# Patient Record
Sex: MALE | Race: WHITE | NOT HISPANIC OR LATINO | Employment: FULL TIME | URBAN - NONMETROPOLITAN AREA
[De-identification: names, ages, dates, MRNs, and addresses within clinical notes are randomized per-mention and may not be internally consistent; named-entity substitution may affect disease eponyms.]

---

## 2017-02-06 ENCOUNTER — ANESTHESIA (OUTPATIENT)
Dept: GASTROENTEROLOGY | Facility: HOSPITAL | Age: 47
End: 2017-02-06

## 2017-02-06 ENCOUNTER — HOSPITAL ENCOUNTER (OUTPATIENT)
Facility: HOSPITAL | Age: 47
Setting detail: HOSPITAL OUTPATIENT SURGERY
Discharge: HOME | End: 2017-02-06
Attending: INTERNAL MEDICINE | Admitting: INTERNAL MEDICINE

## 2017-02-06 ENCOUNTER — ANESTHESIA EVENT (OUTPATIENT)
Dept: GASTROENTEROLOGY | Facility: HOSPITAL | Age: 47
End: 2017-02-06

## 2017-02-06 VITALS
WEIGHT: 275.57 LBS | OXYGEN SATURATION: 96 % | DIASTOLIC BLOOD PRESSURE: 68 MMHG | SYSTOLIC BLOOD PRESSURE: 148 MMHG | RESPIRATION RATE: 18 BRPM | BODY MASS INDEX: 38.58 KG/M2 | TEMPERATURE: 97.6 F | HEART RATE: 102 BPM | HEIGHT: 71 IN

## 2017-02-06 DIAGNOSIS — K21.9 GASTRIC REFLUX: ICD-10-CM

## 2017-02-06 DIAGNOSIS — R19.5 ABNORMAL FECES: ICD-10-CM

## 2017-02-06 PROCEDURE — 88313 SPECIAL STAINS GROUP 2: CPT | Performed by: INTERNAL MEDICINE

## 2017-02-06 PROCEDURE — 25010000002 PROPOFOL 10 MG/ML EMULSION: Performed by: NURSE ANESTHETIST, CERTIFIED REGISTERED

## 2017-02-06 PROCEDURE — 88305 TISSUE EXAM BY PATHOLOGIST: CPT | Performed by: PATHOLOGY

## 2017-02-06 PROCEDURE — 88342 IMHCHEM/IMCYTCHM 1ST ANTB: CPT | Performed by: INTERNAL MEDICINE

## 2017-02-06 PROCEDURE — 88313 SPECIAL STAINS GROUP 2: CPT | Performed by: PATHOLOGY

## 2017-02-06 PROCEDURE — 88342 IMHCHEM/IMCYTCHM 1ST ANTB: CPT | Performed by: PATHOLOGY

## 2017-02-06 PROCEDURE — 88305 TISSUE EXAM BY PATHOLOGIST: CPT | Performed by: INTERNAL MEDICINE

## 2017-02-06 RX ORDER — DEXTROSE AND SODIUM CHLORIDE 5; .45 G/100ML; G/100ML
20 INJECTION, SOLUTION INTRAVENOUS CONTINUOUS
Status: DISCONTINUED | OUTPATIENT
Start: 2017-02-06 | End: 2017-02-06 | Stop reason: HOSPADM

## 2017-02-06 RX ORDER — LIDOCAINE HYDROCHLORIDE 10 MG/ML
INJECTION, SOLUTION INFILTRATION; PERINEURAL AS NEEDED
Status: DISCONTINUED | OUTPATIENT
Start: 2017-02-06 | End: 2017-02-06 | Stop reason: SURG

## 2017-02-06 RX ORDER — PROPOFOL 10 MG/ML
VIAL (ML) INTRAVENOUS AS NEEDED
Status: DISCONTINUED | OUTPATIENT
Start: 2017-02-06 | End: 2017-02-06 | Stop reason: SURG

## 2017-02-06 RX ADMIN — LIDOCAINE HYDROCHLORIDE 100 MG: 10 INJECTION, SOLUTION INFILTRATION; PERINEURAL at 16:05

## 2017-02-06 RX ADMIN — PROPOFOL 50 MG: 10 INJECTION, EMULSION INTRAVENOUS at 16:10

## 2017-02-06 RX ADMIN — PROPOFOL 60 MG: 10 INJECTION, EMULSION INTRAVENOUS at 16:04

## 2017-02-06 RX ADMIN — DEXTROSE AND SODIUM CHLORIDE 20 ML/HR: 5; 450 INJECTION, SOLUTION INTRAVENOUS at 15:23

## 2017-02-06 NOTE — PLAN OF CARE
Problem: GI Endoscopy (Adult)  Goal: Signs and Symptoms of Listed Potential Problems Will be Absent or Manageable (GI Endoscopy)  Outcome: Ongoing (interventions implemented as appropriate)    02/06/17 7499   GI Endoscopy   Problems Assessed (GI Endoscopy) all   Problems Present (GI Endoscopy) none

## 2017-02-06 NOTE — H&P
Dea Shrestha DO,[facility]  Gastroenterology  Hepatology  Endoscopy  Board Certified in Internal Medicine and gastroenterology  [address], [address]  [place]. [address]  - [phone]   F - [phone]     GASTROENTEROLOGY HISTORY AND PHYSICAL  NOTE   DEA SHRESTHA DO.         SUBJECTIVE:   2/6/2017    Name: Dk Goldstein  DOD: 1970      Chief Complaint:   Subjective : Acid reflux, occult blood within the stools, right upper quadrant pain with gallstones.    Patient is 46 y.o. male presents with for elective EGD and colonoscopy.  This is to evaluate because of the progressive problems with acid reflux.  She was found to have occult blood within the stools during the clinic evaluation..      ROS/HISTORY/ CURRENT MEDICATIONS/OBJECTIVE/VS/PE:   Review of Systems:   Review of Systems    History:     Past Medical History   Diagnosis Date   • Acute pharyngitis    • Acute sinusitis    • Allergic rhinitis    • Benign hypertension    • Blood glucose abnormal    • Blood in urine    • Encounter for routine adult health examination      Adult health examination   • Essential hypertension    • Gastroesophageal reflux disease    • Hyperlipidemia    • Muscle pain    • Upper respiratory infection    • Vitamin D deficiency    • Wheezing    • Worried well      No past surgical history on file.  Family History   Problem Relation Age of Onset   • Diabetes Father    • Hypertension Father    • Heart disease Father      Ischemic   • Heart attack Father      MI in 40s     Social History   Substance Use Topics   • Smoking status: Former Smoker     Years: 20.00   • Smokeless tobacco: Not on file   • Alcohol use Not on file     Prescriptions Prior to Admission   Medication Sig Dispense Refill Last Dose   • atenolol (TENORMIN) 25 MG tablet Take 25 mg by mouth Daily.   2/5/2017 at Unknown time   • hydrOXYzine (ATARAX) 25 MG tablet Take 25 mg by mouth Daily. x 30 days   2/5/2017 at Unknown time   • pantoprazole  (PROTONIX) 40 MG EC tablet Take 40 mg by mouth Daily.   2/5/2017 at Unknown time   • simvastatin (ZOCOR) 40 MG tablet Take 40 mg by mouth Daily.   2/5/2017 at Unknown time   • budesonide (RHINOCORT AQUA) 32 MCG/ACT nasal spray 2 sprays into each nostril Every Morning. x 9 days   More than a month at Unknown time   • cyclobenzaprine (FLEXERIL) 10 MG tablet Take  by mouth At Night As Needed.      • fexofenadine (ALLEGRA ALLERGY) 60 MG tablet Take 60 mg by mouth 2 (Two) Times a Day.      • ipratropium (ATROVENT) 0.03 % nasal spray 2 sprays into each nostril Daily As Needed. x 5 days for congestions   More than a month at Unknown time     Allergies:  Amoxicillin; Penicillins; and Cefprozil    I have reviewed the patients medical history, surgical history and family history in the available medical record system.     Current Medications:     Current Facility-Administered Medications   Medication Dose Route Frequency Provider Last Rate Last Dose   • dextrose 5 % and sodium chloride 0.45 % infusion  20 mL/hr Intravenous Continuous Juan Luis Swann DO           Objective     Physical Exam:   Temp:  [98 °F (36.7 °C)] 98 °F (36.7 °C)  Heart Rate:  [86] 86  BP: (169)/(94) 169/94    Physical Exam:  General Appearance:    Alert, cooperative, in no acute distress   Head:    Normocephalic, without obvious abnormality, atraumatic   Eyes:            Lids and lashes normal, conjunctivae and sclerae normal, no   icterus, no pallor, corneas clear, PERRLA   Ears:    Ears appear intact with no abnormalities noted   Throat:   No oral lesions, no thrush, oral mucosa moist   Neck:   No adenopathy, supple, trachea midline, no thyromegaly, no     carotid bruit, no JVD   Back:     No kyphosis present, no scoliosis present, no skin lesions,       erythema or scars, no tenderness to percussion or                   palpation,   range of motion normal   Lungs:     Clear to auscultation,respirations regular, even and                   unlabored     Heart:    Regular rhythm and normal rate, normal S1 and S2, no            murmur, no gallop, no rub, no click   Breast Exam:    Deferred   Abdomen:     Normal bowel sounds, no masses, no organomegaly, soft        non-tender, non-distended, no guarding, no rebound                 tenderness   Genitalia:    Deferred   Extremities:   Moves all extremities well, no edema, no cyanosis, no              redness   Pulses:   Pulses palpable and equal bilaterally   Skin:   No bleeding, bruising or rash   Lymph nodes:   No palpable adenopathy   Neurologic:   Cranial nerves 2 - 12 grossly intact, sensation intact, DTR        present and equal bilaterally      Results Review:     Lab Results   Component Value Date    WBC 7.8 02/14/2014    HGB 13.8 02/14/2014    HCT 41.3 02/14/2014     02/14/2014             No results found for: LIPASE  No results found for: INR       Radiology Review:  Imaging Results (last 72 hours)     ** No results found for the last 72 hours. **           I reviewed the patient's new clinical results.  I reviewed the patient's new imaging results and agree with the interpretation.     ASSESSMENT/PLAN:   ASSESSMENT:   1.  Acid reflux  2.  Occult blood within the stools  3.  Gallstones without cholecystitis    PLAN:   1.  EGD with biopsies  2.  Colonoscopy    Risk and benefits associated with the procedure are reviewed with the patient and he wishes to proceed.       Juan Luis Swann DO  02/06/17  3:20 PM

## 2017-02-06 NOTE — DISCHARGE INSTRUCTIONS
Juan Luis Swann  [address]  [place] [address]  #[phone] Fisher      Please call for an appointment for a 1 month follow-up      Outpatient Instructions for Monitored Anesthesia Care (MAC)    1. You will be released from the hospital in the care of a responsible adult who should remain with you for at least 6 hours.    2. You are at an increased risk for falls following anesthesia. Use care when changing from a lying to a sitting position. Use your assistive devices ( example: cane, walker or family member).    3. You must NOT drive a car, climb high places such as a ladder, or operate equipment such as electric knives,stoves etc...for at least 12 hours. If you are dizzy for longer than 24 hours, notify your doctor.    4. DO NOT drink any alcoholic beverages for at least 24 hours. Anesthesia may impair your judgement.    5. If you smoke, do not smoke alone due to increased risk of burns/fires.    6. DO NOT undertake any legally binding commitment for at least 24 hours. Anesthesia may impair your judgement.

## 2017-02-06 NOTE — ANESTHESIA PREPROCEDURE EVALUATION
Anesthesia Evaluation     Patient summary reviewed and Nursing notes reviewed   NPO Status: > 8 hours   Airway   Mallampati: III  TM distance: >3 FB  Neck ROM: full  no difficulty expected  Dental - normal exam     Pulmonary - normal exam   (+) a smoker Former,   Cardiovascular - negative cardio ROS and normal exam        Neuro/Psych- negative ROS  GI/Hepatic/Renal/Endo    (+)  GERD,     Musculoskeletal (-) negative ROS    Abdominal  - normal exam   Substance History - negative use     OB/GYN negative ob/gyn ROS         Other - negative ROS                                   Anesthesia Plan    ASA 3     MAC     Anesthetic plan and risks discussed with patient.

## 2017-02-06 NOTE — ANESTHESIA POSTPROCEDURE EVALUATION
Patient: Dk Goldstein    Procedure Summary     Date Anesthesia Start Anesthesia Stop Room / Location    02/06/17 [id number] ENDOSCOPY 2 / [facility] ENDOSCOPY       Procedure Diagnosis Surgeon Provider    ESOPHAGOGASTRODUODENOSCOPY (N/A Esophagus); COLONOSCOPY (N/A ) Gastric reflux; Abnormal feces  (GASTRIC REFLUX, ABNORMAL FECES) DO Kym Easton CRNA          Anesthesia Type: MAC  Last vitals  BP      Temp      Pulse     Resp      SpO2        Post Anesthesia Care and Evaluation    Patient location during evaluation: bedside  Patient participation: complete - patient cannot participate  Level of consciousness: sleepy but conscious  Pain score: 0  Pain management: adequate  Airway patency: patent  Anesthetic complications: No anesthetic complications  PONV Status: none  Cardiovascular status: acceptable  Respiratory status: acceptable  Hydration status: acceptable

## 2017-02-06 NOTE — PLAN OF CARE
Problem: Patient Care Overview (Adult)  Goal: Plan of Care Review    02/06/17 4273   Coping/Psychosocial Response Interventions   Plan Of Care Reviewed With patient;spouse   Patient Care Overview   Progress no change

## 2017-02-08 LAB
LAB AP CASE REPORT: NORMAL
LAB AP DIAGNOSIS COMMENT: NORMAL
Lab: NORMAL
PATH REPORT.FINAL DX SPEC: NORMAL
PATH REPORT.GROSS SPEC: NORMAL

## 2017-02-27 ENCOUNTER — CONSULT (OUTPATIENT)
Dept: SURGERY | Facility: CLINIC | Age: 47
End: 2017-02-27

## 2017-02-27 VITALS
DIASTOLIC BLOOD PRESSURE: 86 MMHG | BODY MASS INDEX: 40.04 KG/M2 | WEIGHT: 286 LBS | SYSTOLIC BLOOD PRESSURE: 138 MMHG | HEIGHT: 71 IN

## 2017-02-27 DIAGNOSIS — K80.10 CALCULUS OF GALLBLADDER WITH CHRONIC CHOLECYSTITIS WITHOUT OBSTRUCTION: Primary | ICD-10-CM

## 2017-02-27 PROCEDURE — 99203 OFFICE O/P NEW LOW 30 MIN: CPT | Performed by: SURGERY

## 2017-02-27 RX ORDER — SODIUM CHLORIDE, SODIUM LACTATE, POTASSIUM CHLORIDE, CALCIUM CHLORIDE 600; 310; 30; 20 MG/100ML; MG/100ML; MG/100ML; MG/100ML
100 INJECTION, SOLUTION INTRAVENOUS CONTINUOUS
Status: CANCELLED | OUTPATIENT
Start: 2017-02-27

## 2017-02-27 RX ORDER — ERGOCALCIFEROL 1.25 MG/1
50000 CAPSULE ORAL WEEKLY
COMMUNITY
Start: 2017-02-26

## 2017-02-27 RX ORDER — CHLORHEXIDINE GLUCONATE 0.12 MG/ML
15 RINSE ORAL EVERY 12 HOURS SCHEDULED
Status: CANCELLED | OUTPATIENT
Start: 2017-02-27

## 2017-02-27 RX ORDER — ALBUTEROL SULFATE 90 UG/1
2 AEROSOL, METERED RESPIRATORY (INHALATION) EVERY 6 HOURS
COMMUNITY
Start: 2017-02-14 | End: 2018-02-15

## 2017-02-27 RX ORDER — LEVOFLOXACIN 5 MG/ML
500 INJECTION, SOLUTION INTRAVENOUS ONCE
Status: CANCELLED | OUTPATIENT
Start: 2017-02-27 | End: 2017-02-27

## 2017-02-27 RX ORDER — FUROSEMIDE 40 MG/1
40 TABLET ORAL 2 TIMES DAILY
COMMUNITY
Start: 2016-11-11

## 2017-02-27 RX ORDER — SODIUM CHLORIDE 0.9 % (FLUSH) 0.9 %
1-10 SYRINGE (ML) INJECTION AS NEEDED
Status: CANCELLED | OUTPATIENT
Start: 2017-02-27

## 2017-02-27 RX ORDER — SUCRALFATE 1 G/1
1 TABLET ORAL AS NEEDED
COMMUNITY
Start: 2017-01-21 | End: 2017-03-01

## 2017-02-27 NOTE — PROGRESS NOTES
Chief Complaint   Patient presents with   • Cholelithiasis     possible gallbladder problems   • Hernia     possible hiatus hernia.        Hernia   This is a chronic problem. The current episode started more than 1 year ago. The problem occurs constantly. The problem has been gradually worsening. Associated symptoms include abdominal pain (radiates to the back), nausea and vomiting. Pertinent negatives include no anorexia, arthralgias, change in bowel habit, chest pain, chills, fever, headaches, myalgias, neck pain, numbness, rash, urinary symptoms or weakness. The symptoms are aggravated by eating. He has tried rest for the symptoms. The treatment provided no relief.     CT at [facility] gallstones    EGD   The hypopharynx was normal.  Findings:  - Circumferential salmon-colored mucosa was present at 38 cm and circumferential salmon-colored mucosa was  present from 38 to 40 cm. No other visible abnormalities were present. The maximum longitudinal extent of these  esophageal mucosal changes was 2 cm in length. Biopsies were taken with a cold forceps for histology. Estimated  blood loss: none.  - One mild benign-appearing, intrinsic stenosis was found in the lower third of the esophagus. This measured 1.3 cm  (inner diameter) x less than one cm (in length) and was traversed.  - A 2 cm hiatus hernia was present.  - A small hiatus hernia was present.  - Localized mild inflammation characterized by erythema was found in the gastric antrum. Estimated blood loss: none.  - The examined duodenum was normal.    Colonoscopy  - A 10 mm polyp was found in the sigmoid colon. The polyp was sessile. The polyp was removed with a hot snare.  Resection and retrieval were complete. Estimated blood loss: none.  Findings:  - Three sessile polyps were found in the sigmoid colon. The polyps were 3 to 6 mm in size. These polyps were  removed with a cold biopsy forceps. Resection and retrieval were complete. Estimated blood loss: none.  -  Multiple small and large-mouthed diverticula were found in the sigmoid colon and in the descending colon.  - Non-bleeding internal hemorrhoids were found during retroflexion. The hemorrhoids were Grade I (internal  hemorrhoids that do not prolapse).  - The exam was otherwise without abnormality.      Past Medical History   Diagnosis Date   • Acute pharyngitis    • Acute sinusitis    • Allergic rhinitis    • Benign hypertension    • Blood glucose abnormal    • Blood in urine    • Encounter for routine adult health examination      Adult health examination   • Essential hypertension    • Gastroesophageal reflux disease    • Hyperlipidemia    • Muscle pain    • Upper respiratory infection    • Vitamin D deficiency    • Wheezing    • Worried well        Past Surgical History   Procedure Laterality Date   • Endoscopy N/A 2/6/2017     Procedure: ESOPHAGOGASTRODUODENOSCOPY;  Surgeon: Juan Luis Swann DO;  Location: [facility] ENDOSCOPY;  Service:    • Colonoscopy N/A 2/6/2017     Procedure: COLONOSCOPY;  Surgeon: Juan Luis Swann DO;  Location: [facility] ENDOSCOPY;  Service:          Current Outpatient Prescriptions:   •  albuterol (PROVENTIL HFA;VENTOLIN HFA) 108 (90 BASE) MCG/ACT inhaler, Inhale 2 puffs Every 6 (Six) Hours., Disp: , Rfl:   •  atenolol (TENORMIN) 25 MG tablet, Take 25 mg by mouth Daily., Disp: , Rfl:   •  cyclobenzaprine (FLEXERIL) 10 MG tablet, Take  by mouth At Night As Needed., Disp: , Rfl:   •  furosemide (LASIX) 40 MG tablet, Take 40 mg by mouth Daily., Disp: , Rfl:   •  hydrOXYzine (ATARAX) 25 MG tablet, Take 25 mg by mouth Daily. x 30 days, Disp: , Rfl:   •  pantoprazole (PROTONIX) 40 MG EC tablet, Take 40 mg by mouth Daily., Disp: , Rfl:   •  simvastatin (ZOCOR) 40 MG tablet, Take 40 mg by mouth Daily., Disp: , Rfl:   •  vitamin D (ERGOCALCIFEROL) 53777 UNITS capsule capsule, Take 50,000 Units by mouth 1 (One) Time Per Week., Disp: , Rfl:   •  budesonide (RHINOCORT AQUA) 32 MCG/ACT nasal spray, 2  sprays into each nostril Every Morning. x 9 days, Disp: , Rfl:   •  fexofenadine (ALLEGRA ALLERGY) 60 MG tablet, Take 60 mg by mouth 2 (Two) Times a Day., Disp: , Rfl:   •  ipratropium (ATROVENT) 0.03 % nasal spray, 2 sprays into each nostril Daily As Needed. x 5 days for congestions, Disp: , Rfl:   •  sucralfate (CARAFATE) 1 G tablet, Take 1 tablet by mouth As Needed., Disp: , Rfl:     Allergies   Allergen Reactions   • Amoxicillin    • Penicillins    • Cefprozil Rash       Family History   Problem Relation Age of Onset   • Diabetes Father    • Hypertension Father    • Heart disease Father      Ischemic   • Heart attack Father      MI in 40s       Social History     Social History   • Marital status:        Social History Main Topics   • Smoking status: Former Smoker     Years: 20.00   • Smokeless tobacco: Not on file   • Alcohol use No   • Drug use: Not on file   • Sexual activity: Not on file      Comment:          Review of Systems   Constitutional: Negative for activity change, appetite change, chills and fever.   HENT: Negative for hearing loss, nosebleeds and trouble swallowing.    Respiratory: Positive for wheezing.    Cardiovascular: Negative for chest pain, palpitations and leg swelling.   Gastrointestinal: Positive for abdominal pain (radiates to the back), blood in stool, nausea and vomiting. Negative for abdominal distention, anal bleeding, anorexia, change in bowel habit, constipation, diarrhea and rectal pain.   Endocrine: Negative for cold intolerance, heat intolerance, polydipsia and polyuria.   Genitourinary: Positive for hematuria. Negative for decreased urine volume, difficulty urinating, dysuria, enuresis, frequency and urgency.   Musculoskeletal: Negative for arthralgias, back pain, gait problem, myalgias and neck pain.   Skin: Negative for pallor, rash and wound.   Allergic/Immunologic: Negative for immunocompromised state.   Neurological: Negative for dizziness, seizures,  weakness, light-headedness, numbness and headaches.   Psychiatric/Behavioral: Negative for agitation and behavioral problems. The patient is not nervous/anxious.        Physical Exam   Constitutional: He is oriented to person, place, and time and well-developed, well-nourished, and in no distress. Vital signs are normal. No distress.       HENT:   Head: Normocephalic and atraumatic.   Eyes: Conjunctivae and EOM are normal. Pupils are equal, round, and reactive to light.   Neck: Normal range of motion. Neck supple. No JVD present. No tracheal deviation present. No thyromegaly present.   Cardiovascular: Normal rate, regular rhythm and normal heart sounds.    Pulmonary/Chest: Effort normal and breath sounds normal. No stridor. No respiratory distress. He has no wheezes. He has no rales. He exhibits no tenderness.   Abdominal: Soft. Normal appearance and bowel sounds are normal. He exhibits no distension and no mass. There is no hepatomegaly. There is tenderness (mild RUQ pain). There is no rebound and no guarding. No hernia.       Lymphadenopathy:     He has no cervical adenopathy.     He has no axillary adenopathy.   Neurological: He is oriented to person, place, and time.   Skin: Skin is warm, dry and intact. No lesion and no rash noted. He is not diaphoretic. No erythema.   Psychiatric: Mood, memory, affect and judgment normal.   Nursing note and vitals reviewed.      ASSESSMENT    Dk was seen today for cholelithiasis and hernia.    Diagnoses and all orders for this visit:    Calculus of gallbladder with chronic cholecystitis without obstruction  -     Case Request; Standing  -     Comprehensive Metabolic Panel; Future  -     ECG 12 Lead; Future  -     chlorhexidine (PERIDEX) 0.12 % solution 15 mL; Apply 15 mL to the mouth or throat Every 12 (Twelve) Hours.  -     sodium chloride 0.9 % flush 1-10 mL; Infuse 1-10 mL into a venous catheter As Needed for line care.  -     lactated ringers infusion; Infuse 100  mL/hr into a venous catheter Continuous.  -     levoFLOXacin (LEVAQUIN) 500 mg in sodium chloride 0.9 % 150 mL IVPB; Infuse 500 mg into a venous catheter 1 (One) Time.  -     Case Request    Other orders  -     Follow anesthesia standing orders.; Future  -     Provide instructions to patient on NPO status  -     Follow anesthesia standing orders.; Standing  -     Verify NPO Status; Standing  -     Obtain informed consent; Standing  -     SCD (sequential compression device)- to be placed on patient in Pre-op; Standing  -     Clip operative site; Standing  -     Have patient void prior to transfer; Standing  -     Insert Peripheral IV; Standing  -     Saline Lock & Maintain IV Access; Standing      PLAN    1. Laparoscopic possible open cholecystectomy    This procedure involves removing the gallbladder.  The surgery may be done laparoscopically. Laparoscopic surgery is done using a scope and hollow tube(s) called ports. These are inserted through small cuts in the abdomen. A scope is a thin, lighted instrument with a camera attached. Tools are passed through the ports. Carbon dioxide gas is pumped into the abdomen to create workspace.   If the surgery cannot be performed with a scope, it may be done through a larger cut. This occurs 2% of the time.  The gall bladder will be removed after it is  from the liver, bile duct, and surrounding arteries. An x-ray of the bile ducts is usually performed with contrast dye injected into the ducts.  If stones are found, another procedure may be required to remove them.  A drain may rarely be inserted to keep fluid from building up in the treatment area.   Alternatives include:  * Watching and waiting with no surgery  * Removing the gallbladder through one large incision made in the abdomen.  Prognosis and possible risks if no operation/procedure is performed:  Pain, infection, inflammation, and/or stones may continue or get worse.  Risks of the operation/procedure:  *  Exposure to radiation. Pregnant women and women of childbearing age should talk with their doctor about this.  * Pain, numbness, swelling, weakness or scarring where tissue is cut.  * The gas used in laparoscopic procedures to inflate the abdomen can become trapped in tissues. Gas in the bloodstream can dangerously affect blood flow and  heart function.  * The procedure may not cure or relieve your condition or symptoms. They may come back and even worsen.  * You may need additional tests or treatment.  * Bleeding. You may need blood transfusions or other treatments. This may be discovered during the procedure, or later.  * Embolism. An embolism is an object that moves through your body in your bloodstream. It can be an air bubble, a blood clot, a piece of fat or other material. It can block a blood vessel. This can lead to stroke, pulmonary embolism (blockage of the main artery of the lung), or injury to organs or extremities.  * Reactions to dye used for imaging. These may include hives, swelling of the face and/or throat, difficulty breathing, and kidney failure.  * Retained stones in bile ducts.  * Wound infection, poor healing or reopening. Blood or clear fluid can also collect at the wound site(s).  * Damage to the bile ducts or nearby structures. This may be discovered during the procedure, or later.  * Incisional hernia. Weak scar tissue may allow tissue to bulge through the cut.  * The instrument(s) placed in your abdomen can cause injuries to nearby structures.  * Death.  Expectations:  * This procedure may relieve or prevent infection, inflammation, and/or pain from stones or blockage of bile ducts.  * If you are on metformin, it will need to be held for 48 hours after surgery.    Signed by Oswaldo Cates MD

## 2017-03-01 ENCOUNTER — APPOINTMENT (OUTPATIENT)
Dept: PREADMISSION TESTING | Facility: HOSPITAL | Age: 47
End: 2017-03-01

## 2017-03-01 VITALS
RESPIRATION RATE: 16 BRPM | WEIGHT: 281 LBS | HEIGHT: 71 IN | DIASTOLIC BLOOD PRESSURE: 70 MMHG | SYSTOLIC BLOOD PRESSURE: 120 MMHG | HEART RATE: 79 BPM | BODY MASS INDEX: 39.34 KG/M2 | OXYGEN SATURATION: 96 %

## 2017-03-01 DIAGNOSIS — K80.10 CALCULUS OF GALLBLADDER WITH CHRONIC CHOLECYSTITIS WITHOUT OBSTRUCTION: ICD-10-CM

## 2017-03-01 LAB
ALBUMIN SERPL-MCNC: 4.4 G/DL (ref 3.4–4.8)
ALBUMIN/GLOB SERPL: 1.3 G/DL (ref 1.1–1.8)
ALP SERPL-CCNC: 63 U/L (ref 38–126)
ALT SERPL W P-5'-P-CCNC: 85 U/L (ref 21–72)
ANION GAP SERPL CALCULATED.3IONS-SCNC: 8 MMOL/L (ref 5–15)
AST SERPL-CCNC: 73 U/L (ref 17–59)
BILIRUB SERPL-MCNC: 0.7 MG/DL (ref 0.2–1.3)
BUN BLD-MCNC: 16 MG/DL (ref 7–21)
BUN/CREAT SERPL: 15.4 (ref 7–25)
CALCIUM SPEC-SCNC: 10.1 MG/DL (ref 8.4–10.2)
CHLORIDE SERPL-SCNC: 102 MMOL/L (ref 95–110)
CO2 SERPL-SCNC: 28 MMOL/L (ref 22–31)
CREAT BLD-MCNC: 1.04 MG/DL (ref 0.7–1.3)
GFR SERPL CREATININE-BSD FRML MDRD: 77 ML/MIN/1.73 (ref 60–147)
GLOBULIN UR ELPH-MCNC: 3.3 GM/DL (ref 2.3–3.5)
GLUCOSE BLD-MCNC: 78 MG/DL (ref 60–100)
POTASSIUM BLD-SCNC: 4.6 MMOL/L (ref 3.5–5.1)
PROT SERPL-MCNC: 7.7 G/DL (ref 6.3–8.6)
SODIUM BLD-SCNC: 138 MMOL/L (ref 137–145)

## 2017-03-01 PROCEDURE — 80053 COMPREHEN METABOLIC PANEL: CPT | Performed by: SURGERY

## 2017-03-01 PROCEDURE — 93005 ELECTROCARDIOGRAM TRACING: CPT

## 2017-03-01 PROCEDURE — 36415 COLL VENOUS BLD VENIPUNCTURE: CPT

## 2017-03-01 NOTE — DISCHARGE INSTRUCTIONS
[facility]    Preparing the Skin Before Surgery  Preparing or “prepping” skin before surgery can reduce the risk of infection at the surgical site. To make the process easier, this facility has chosen disposable cloths moistened with a rinse-free, 2% Chlorhexidine Gluconate (CHG) antiseptic solution. The steps below outline the prepping process and should be carefully followed.      Prep the skin at the following time(s):                       Prep the circled area(s) only:        *Skin should be prepped at home on   the night prior to surgery.         *If you wish to bathe/shower, do so   at least one hour before you prep   your skin with the cloths.  *Do not shave hair in surgical area for at   least 2 days prior to applying prep  _______________________________  _______________________________       Directions for Prepping Skin:  ? When applying CHG, your skin should be completely dry and cool.  ? Open package and remove cloths. Avoid touching cloths to any surface other than specified area to reduce the risk of contamination.  ? Prep the area(s) circled above by wiping the area in a back-and-forth motion.    Avoid contact with eyes, ears, mouth, and mucous membranes. When applied to sensitive skin, CHG may cause skin irritation such as a temporary itching sensation  and/or redness.         If itching or redness persists, rinse affected areas and discontinue use.  ? Use all cloths in the package(s).   ? Allow area to air dry for one minute. DO NOT RINSE. It is normal for the skin to have a temporary “tacky” feel for several minutes after the antiseptic solution is applied.   ? Discard cloths in trash can.  ? Place the Prep Check™ sticker(s) from the package on the bottom of this sheet as indicated.  ? Once the skin prep has been completed, do not bathe/shower, apply make-up, powder, or lotions to skin.                  [facility]  Pre-op Information and Guidelines    You will  be called after 2 p.m. the day before your surgery (Friday for Monday surgery) and notified of your time for arrival and approximate surgery time.  If you have not received a call by 4P.M., please contact Same Day Surgery at [phone] of if outside [facility] call [phone].    Please Follow these Important Safety Guidelines:    • The morning of your procedure, take only the medications listed below with   A sip of water:_____________________________________________       ______________________________________________    • DO NOT eat or drink anything after 12:00 midnight the night before surgery  Specific instructions concerning drinking clear liquids will be discussed during  the pre-surgery instruction call the day before your surgery.    • If you take a blood thinner (ex. Plavix, Coumadin, aspirin), ask your doctor when to stop it before surgery  STOP DATE: _________________    • Only 2 visitors are allowed in patient rooms at a time  Your visitors will be asked to wait in the lobby until the admission process is complete with the exception of a parent with a child and patients in need of special assistance.    • YOU CANNOT DRIVE YOURSELF HOME  You must be accompanied by someone who will be responsible for driving you home after surgery and for your care at home.    • DO NOT chew gum, use breath mints, hard candy, or smoke the day of surgery  • DO NOT drink alcohol for at least 24 hours before your surgery  • DO NOT wear any jewelry and remove all body piercing before coming to the hospital  • DO NOT wear make-up to the hospital  • If you are having surgery on an extremity (arm/leg/foot) remove nail polish/artificial nails on the surgical side  • Clothing, glasses, contacts, dentures, and hairpieces must be removed before surgery  Bathe the night before or the morning of your surgery and do not use powders/lotions on skin.

## 2017-03-03 ENCOUNTER — ANESTHESIA EVENT (OUTPATIENT)
Dept: PERIOP | Facility: HOSPITAL | Age: 47
End: 2017-03-03

## 2017-03-06 ENCOUNTER — APPOINTMENT (OUTPATIENT)
Dept: GENERAL RADIOLOGY | Facility: HOSPITAL | Age: 47
End: 2017-03-06

## 2017-03-06 ENCOUNTER — ANESTHESIA (OUTPATIENT)
Dept: PERIOP | Facility: HOSPITAL | Age: 47
End: 2017-03-06

## 2017-03-06 ENCOUNTER — HOSPITAL ENCOUNTER (OUTPATIENT)
Facility: HOSPITAL | Age: 47
Setting detail: HOSPITAL OUTPATIENT SURGERY
Discharge: HOME | End: 2017-03-06
Attending: SURGERY | Admitting: SURGERY

## 2017-03-06 VITALS
HEART RATE: 75 BPM | BODY MASS INDEX: 43.98 KG/M2 | TEMPERATURE: 97 F | HEIGHT: 67 IN | SYSTOLIC BLOOD PRESSURE: 142 MMHG | RESPIRATION RATE: 18 BRPM | WEIGHT: 280.2 LBS | OXYGEN SATURATION: 97 % | DIASTOLIC BLOOD PRESSURE: 80 MMHG

## 2017-03-06 DIAGNOSIS — K80.10 CALCULUS OF GALLBLADDER WITH CHRONIC CHOLECYSTITIS WITHOUT OBSTRUCTION: ICD-10-CM

## 2017-03-06 PROCEDURE — 25010000002 MIDAZOLAM PER 1 MG: Performed by: NURSE ANESTHETIST, CERTIFIED REGISTERED

## 2017-03-06 PROCEDURE — 25010000002 ONDANSETRON PER 1 MG: Performed by: NURSE ANESTHETIST, CERTIFIED REGISTERED

## 2017-03-06 PROCEDURE — C1758 CATHETER, URETERAL: HCPCS | Performed by: SURGERY

## 2017-03-06 PROCEDURE — 0 IOPAMIDOL 61 % SOLUTION: Performed by: SURGERY

## 2017-03-06 PROCEDURE — 47563 LAPARO CHOLECYSTECTOMY/GRAPH: CPT | Performed by: SURGERY

## 2017-03-06 PROCEDURE — 25010000002 PROPOFOL 10 MG/ML EMULSION: Performed by: NURSE ANESTHETIST, CERTIFIED REGISTERED

## 2017-03-06 PROCEDURE — 88304 TISSUE EXAM BY PATHOLOGIST: CPT | Performed by: PATHOLOGY

## 2017-03-06 PROCEDURE — 88304 TISSUE EXAM BY PATHOLOGIST: CPT | Performed by: SURGERY

## 2017-03-06 PROCEDURE — 25010000002 KETOROLAC TROMETHAMINE PER 15 MG: Performed by: NURSE ANESTHETIST, CERTIFIED REGISTERED

## 2017-03-06 PROCEDURE — 25010000002 EPINEPHRINE PER 0.1 MG: Performed by: SURGERY

## 2017-03-06 PROCEDURE — 25010000002 NEOSTIGMINE PER 0.5 MG: Performed by: NURSE ANESTHETIST, CERTIFIED REGISTERED

## 2017-03-06 PROCEDURE — 25010000002 FENTANYL CITRATE (PF) 100 MCG/2ML SOLUTION: Performed by: NURSE ANESTHETIST, CERTIFIED REGISTERED

## 2017-03-06 PROCEDURE — 25010000002 SUCCINYLCHOLINE PER 20 MG: Performed by: NURSE ANESTHETIST, CERTIFIED REGISTERED

## 2017-03-06 PROCEDURE — 76000 FLUOROSCOPY <1 HR PHYS/QHP: CPT

## 2017-03-06 PROCEDURE — 25010000002 LEVOFLOXACIN PER 250 MG: Performed by: SURGERY

## 2017-03-06 RX ORDER — BUPIVACAINE HYDROCHLORIDE AND EPINEPHRINE 5; 5 MG/ML; UG/ML
INJECTION, SOLUTION EPIDURAL; INTRACAUDAL; PERINEURAL AS NEEDED
Status: DISCONTINUED | OUTPATIENT
Start: 2017-03-06 | End: 2017-03-06 | Stop reason: HOSPADM

## 2017-03-06 RX ORDER — ONDANSETRON 2 MG/ML
4 INJECTION INTRAMUSCULAR; INTRAVENOUS ONCE AS NEEDED
Status: COMPLETED | OUTPATIENT
Start: 2017-03-06 | End: 2017-03-06

## 2017-03-06 RX ORDER — SODIUM CHLORIDE, SODIUM LACTATE, POTASSIUM CHLORIDE, CALCIUM CHLORIDE 600; 310; 30; 20 MG/100ML; MG/100ML; MG/100ML; MG/100ML
100 INJECTION, SOLUTION INTRAVENOUS CONTINUOUS
Status: DISCONTINUED | OUTPATIENT
Start: 2017-03-06 | End: 2017-03-06 | Stop reason: HOSPADM

## 2017-03-06 RX ORDER — FENTANYL CITRATE 50 UG/ML
INJECTION, SOLUTION INTRAMUSCULAR; INTRAVENOUS AS NEEDED
Status: DISCONTINUED | OUTPATIENT
Start: 2017-03-06 | End: 2017-03-06 | Stop reason: SURG

## 2017-03-06 RX ORDER — ONDANSETRON 2 MG/ML
INJECTION INTRAMUSCULAR; INTRAVENOUS AS NEEDED
Status: DISCONTINUED | OUTPATIENT
Start: 2017-03-06 | End: 2017-03-06 | Stop reason: SURG

## 2017-03-06 RX ORDER — ROCURONIUM BROMIDE 10 MG/ML
INJECTION, SOLUTION INTRAVENOUS AS NEEDED
Status: DISCONTINUED | OUTPATIENT
Start: 2017-03-06 | End: 2017-03-06 | Stop reason: SURG

## 2017-03-06 RX ORDER — LABETALOL HYDROCHLORIDE 5 MG/ML
5 INJECTION, SOLUTION INTRAVENOUS
Status: DISCONTINUED | OUTPATIENT
Start: 2017-03-06 | End: 2017-03-06 | Stop reason: HOSPADM

## 2017-03-06 RX ORDER — GLYCOPYRROLATE 0.2 MG/ML
INJECTION INTRAMUSCULAR; INTRAVENOUS AS NEEDED
Status: DISCONTINUED | OUTPATIENT
Start: 2017-03-06 | End: 2017-03-06 | Stop reason: SURG

## 2017-03-06 RX ORDER — HYDROCODONE BITARTRATE AND ACETAMINOPHEN 7.5; 325 MG/1; MG/1
1 TABLET ORAL EVERY 4 HOURS PRN
Qty: 20 TABLET | Refills: 0 | Status: SHIPPED | OUTPATIENT
Start: 2017-03-06 | End: 2018-02-05

## 2017-03-06 RX ORDER — BACTERIOSTATIC SODIUM CHLORIDE 0.9 %
VIAL (ML) INJECTION AS NEEDED
Status: DISCONTINUED | OUTPATIENT
Start: 2017-03-06 | End: 2017-03-06 | Stop reason: HOSPADM

## 2017-03-06 RX ORDER — HYDROCODONE BITARTRATE AND ACETAMINOPHEN 7.5; 325 MG/1; MG/1
1 TABLET ORAL ONCE AS NEEDED
Status: DISCONTINUED | OUTPATIENT
Start: 2017-03-06 | End: 2017-03-06 | Stop reason: HOSPADM

## 2017-03-06 RX ORDER — CHLORHEXIDINE GLUCONATE 0.12 MG/ML
15 RINSE ORAL EVERY 12 HOURS SCHEDULED
Status: DISCONTINUED | OUTPATIENT
Start: 2017-03-06 | End: 2017-03-06 | Stop reason: HOSPADM

## 2017-03-06 RX ORDER — PROPOFOL 10 MG/ML
VIAL (ML) INTRAVENOUS AS NEEDED
Status: DISCONTINUED | OUTPATIENT
Start: 2017-03-06 | End: 2017-03-06 | Stop reason: SURG

## 2017-03-06 RX ORDER — METOCLOPRAMIDE HYDROCHLORIDE 5 MG/ML
10 INJECTION INTRAMUSCULAR; INTRAVENOUS ONCE AS NEEDED
Status: DISCONTINUED | OUTPATIENT
Start: 2017-03-06 | End: 2017-03-06 | Stop reason: HOSPADM

## 2017-03-06 RX ORDER — LEVOFLOXACIN 5 MG/ML
500 INJECTION, SOLUTION INTRAVENOUS ONCE
Status: COMPLETED | OUTPATIENT
Start: 2017-03-06 | End: 2017-03-06

## 2017-03-06 RX ORDER — NALOXONE HCL 0.4 MG/ML
0.2 VIAL (ML) INJECTION AS NEEDED
Status: DISCONTINUED | OUTPATIENT
Start: 2017-03-06 | End: 2017-03-06 | Stop reason: HOSPADM

## 2017-03-06 RX ORDER — HYDRALAZINE HYDROCHLORIDE 20 MG/ML
5 INJECTION INTRAMUSCULAR; INTRAVENOUS
Status: DISCONTINUED | OUTPATIENT
Start: 2017-03-06 | End: 2017-03-06 | Stop reason: HOSPADM

## 2017-03-06 RX ORDER — KETOROLAC TROMETHAMINE 30 MG/ML
INJECTION, SOLUTION INTRAMUSCULAR; INTRAVENOUS AS NEEDED
Status: DISCONTINUED | OUTPATIENT
Start: 2017-03-06 | End: 2017-03-06 | Stop reason: SURG

## 2017-03-06 RX ORDER — FLUMAZENIL 0.1 MG/ML
0.2 INJECTION INTRAVENOUS AS NEEDED
Status: DISCONTINUED | OUTPATIENT
Start: 2017-03-06 | End: 2017-03-06 | Stop reason: HOSPADM

## 2017-03-06 RX ORDER — SODIUM CHLORIDE 0.9 % (FLUSH) 0.9 %
1-10 SYRINGE (ML) INJECTION AS NEEDED
Status: DISCONTINUED | OUTPATIENT
Start: 2017-03-06 | End: 2017-03-06 | Stop reason: HOSPADM

## 2017-03-06 RX ORDER — MIDAZOLAM HYDROCHLORIDE 1 MG/ML
INJECTION INTRAMUSCULAR; INTRAVENOUS AS NEEDED
Status: DISCONTINUED | OUTPATIENT
Start: 2017-03-06 | End: 2017-03-06 | Stop reason: SURG

## 2017-03-06 RX ORDER — SUCCINYLCHOLINE CHLORIDE 20 MG/ML
INJECTION INTRAMUSCULAR; INTRAVENOUS AS NEEDED
Status: DISCONTINUED | OUTPATIENT
Start: 2017-03-06 | End: 2017-03-06 | Stop reason: SURG

## 2017-03-06 RX ADMIN — KETOROLAC TROMETHAMINE 15 MG: 30 INJECTION, SOLUTION INTRAMUSCULAR; INTRAVENOUS at 14:50

## 2017-03-06 RX ADMIN — ROCURONIUM BROMIDE 5 MG: 10 INJECTION INTRAVENOUS at 13:18

## 2017-03-06 RX ADMIN — FENTANYL CITRATE 50 MCG: 50 INJECTION, SOLUTION INTRAMUSCULAR; INTRAVENOUS at 14:40

## 2017-03-06 RX ADMIN — CHLORHEXIDINE GLUCONATE 15 ML: 1.2 RINSE ORAL at 11:32

## 2017-03-06 RX ADMIN — FENTANYL CITRATE 50 MCG: 50 INJECTION, SOLUTION INTRAMUSCULAR; INTRAVENOUS at 15:10

## 2017-03-06 RX ADMIN — FENTANYL CITRATE 100 MCG: 50 INJECTION, SOLUTION INTRAMUSCULAR; INTRAVENOUS at 13:19

## 2017-03-06 RX ADMIN — SODIUM CHLORIDE, POTASSIUM CHLORIDE, SODIUM LACTATE AND CALCIUM CHLORIDE 100 ML/HR: 600; 310; 30; 20 INJECTION, SOLUTION INTRAVENOUS at 11:17

## 2017-03-06 RX ADMIN — SUCCINYLCHOLINE CHLORIDE 170 MG: 20 INJECTION, SOLUTION INTRAMUSCULAR; INTRAVENOUS at 13:19

## 2017-03-06 RX ADMIN — ONDANSETRON 4 MG: 2 INJECTION INTRAMUSCULAR; INTRAVENOUS at 15:26

## 2017-03-06 RX ADMIN — ROCURONIUM BROMIDE 30 MG: 10 INJECTION INTRAVENOUS at 13:28

## 2017-03-06 RX ADMIN — NEOSTIGMINE METHYLSULFATE 2 MG: 1 INJECTION, SOLUTION INTRAMUSCULAR; INTRAVENOUS; SUBCUTANEOUS at 14:53

## 2017-03-06 RX ADMIN — FENTANYL CITRATE 50 MCG: 50 INJECTION, SOLUTION INTRAMUSCULAR; INTRAVENOUS at 13:55

## 2017-03-06 RX ADMIN — PROPOFOL 200 MG: 10 INJECTION, EMULSION INTRAVENOUS at 13:19

## 2017-03-06 RX ADMIN — ONDANSETRON 4 MG: 2 INJECTION INTRAMUSCULAR; INTRAVENOUS at 14:41

## 2017-03-06 RX ADMIN — MIDAZOLAM 2 MG: 1 INJECTION INTRAMUSCULAR; INTRAVENOUS at 13:16

## 2017-03-06 RX ADMIN — ROCURONIUM BROMIDE 15 MG: 10 INJECTION INTRAVENOUS at 14:05

## 2017-03-06 RX ADMIN — LEVOFLOXACIN 500 MG: 5 INJECTION, SOLUTION INTRAVENOUS at 13:18

## 2017-03-06 RX ADMIN — GLYCOPYRROLATE 0.4 MG: 0.2 INJECTION, SOLUTION INTRAMUSCULAR; INTRAVENOUS at 14:53

## 2017-03-06 NOTE — PLAN OF CARE
Problem: Patient Care Overview (Adult)  Goal: Plan of Care Review  Outcome: Ongoing (interventions implemented as appropriate)    03/06/17 1539   Coping/Psychosocial Response Interventions   Plan Of Care Reviewed With patient   Patient Care Overview   Progress improving   Outcome Evaluation   Outcome Summary/Follow up Plan vss, dc per anesthesia protocol.         Problem: Perioperative Period (Adult)  Goal: Signs and Symptoms of Listed Potential Problems Will be Absent or Manageable (Perioperative Period)  Outcome: Ongoing (interventions implemented as appropriate)

## 2017-03-06 NOTE — DISCHARGE INSTRUCTIONS
Dr. Oswaldo Cates  [address] [place] [address]  [phone] (office)  [phone] (hospital)  Discharge Instructions for Gall Bladder Surgery      1. Go home, rest and take it easy today; however, you should get up and move about several times today to reduce the risk of developing a clot in your legs.      2. You may experience some dizziness or memory loss from the anesthesia.  This may last for the next 24 hours.  Someone should plan on staying with you for the first 24 hours for your safety.    3. Do not make any important legal decisions or sign any legal papers for the next 24 hours.      4. Eat and drink lightly today.  Start off with liquids, jello, soup, crackers or other bland foods at first. You may advance your diet tomorrow as tolerated as long as you do not experience any nausea or vomiting.     5. You may remove your outer dressings in 3 days.  The white tapes called steri-strips should stay in place.  They will fall off on their own in 1-2 weeks.  Do not worry if they come off sooner.      6. You may notice some bleeding/drainage on your outer dressings. A little bloody drainage is normal. If the bleeding/drainage is such that the bandage cannot absorb it, remove the dressing, apply clean gauze and apply firm pressure for a full 15 minutes.  If the bleeding continues, please call me.    7. You may shower tomorrow.  No tub baths for at least 1 week until your incisions are completely healed.         8. You have received a prescription for a narcotic pain medicine, as you will have some pain following surgery.   You will not be totally pain free, but your pain medicine should make the pain tolerable.  Please take your pain medicine as prescribed and always take your pills with food to prevent nausea. If you are having severe pain that cannot be controlled by the pain medicine, please contact me.      9. If needed, you may receive a prescription for an anti-nausea medicine.  Please take this as  prescribed for any nausea or vomiting.  Nausea could be a result of the anesthesia or a result of the narcotic pain medicine.  If you experience severe nausea and vomiting that cannot be controlled by the nausea medicine, please call me.            10. It is not unusual to experience pain/discomfort in your shoulders or under your ribs after surgery.  It is from the gas used during the laparoscopic procedure and usually lasts 1-3 days.  The prescription pain medicine is used to treat the surgical pain and does not typically alleviate this “gassy” pain.     11. No driving for 24 hours and for as long as you are taking your prescription pain medicine.      12. If you have a DRAIN(S):  · Sponge bath only. Once removed, and the skin is covered, you may shower.      13. If an appointment is not given to you before discharge, you will need to call the office at [phone] to schedule a follow-up appointment in 5-7 days.     14. Remember to contact me for any of the following:    • Fever > 101 degrees  • Severe pain that cannot be controlled by taking your pain pills  • Severe nausea or vomiting that cannot be controlled by taking your nausea pills  • Significant bleeding of your incisions  • Drainage that has a bad smell or is yellow or green in appearance  • Any other questions or concerns

## 2017-03-06 NOTE — H&P (VIEW-ONLY)
Chief Complaint   Patient presents with   • Cholelithiasis     possible gallbladder problems   • Hernia     possible hiatus hernia.        Hernia   This is a chronic problem. The current episode started more than 1 year ago. The problem occurs constantly. The problem has been gradually worsening. Associated symptoms include abdominal pain (radiates to the back), nausea and vomiting. Pertinent negatives include no anorexia, arthralgias, change in bowel habit, chest pain, chills, fever, headaches, myalgias, neck pain, numbness, rash, urinary symptoms or weakness. The symptoms are aggravated by eating. He has tried rest for the symptoms. The treatment provided no relief.     CT at [facility] gallstones    EGD   The hypopharynx was normal.  Findings:  - Circumferential salmon-colored mucosa was present at 38 cm and circumferential salmon-colored mucosa was  present from 38 to 40 cm. No other visible abnormalities were present. The maximum longitudinal extent of these  esophageal mucosal changes was 2 cm in length. Biopsies were taken with a cold forceps for histology. Estimated  blood loss: none.  - One mild benign-appearing, intrinsic stenosis was found in the lower third of the esophagus. This measured 1.3 cm  (inner diameter) x less than one cm (in length) and was traversed.  - A 2 cm hiatus hernia was present.  - A small hiatus hernia was present.  - Localized mild inflammation characterized by erythema was found in the gastric antrum. Estimated blood loss: none.  - The examined duodenum was normal.    Colonoscopy  - A 10 mm polyp was found in the sigmoid colon. The polyp was sessile. The polyp was removed with a hot snare.  Resection and retrieval were complete. Estimated blood loss: none.  Findings:  - Three sessile polyps were found in the sigmoid colon. The polyps were 3 to 6 mm in size. These polyps were  removed with a cold biopsy forceps. Resection and retrieval were complete. Estimated blood loss: none.  -  Multiple small and large-mouthed diverticula were found in the sigmoid colon and in the descending colon.  - Non-bleeding internal hemorrhoids were found during retroflexion. The hemorrhoids were Grade I (internal  hemorrhoids that do not prolapse).  - The exam was otherwise without abnormality.      Past Medical History   Diagnosis Date   • Acute pharyngitis    • Acute sinusitis    • Allergic rhinitis    • Benign hypertension    • Blood glucose abnormal    • Blood in urine    • Encounter for routine adult health examination      Adult health examination   • Essential hypertension    • Gastroesophageal reflux disease    • Hyperlipidemia    • Muscle pain    • Upper respiratory infection    • Vitamin D deficiency    • Wheezing    • Worried well        Past Surgical History   Procedure Laterality Date   • Endoscopy N/A 2/6/2017     Procedure: ESOPHAGOGASTRODUODENOSCOPY;  Surgeon: Juan Luis Swann DO;  Location: [facility] ENDOSCOPY;  Service:    • Colonoscopy N/A 2/6/2017     Procedure: COLONOSCOPY;  Surgeon: Juan Luis Swann DO;  Location: [facility] ENDOSCOPY;  Service:          Current Outpatient Prescriptions:   •  albuterol (PROVENTIL HFA;VENTOLIN HFA) 108 (90 BASE) MCG/ACT inhaler, Inhale 2 puffs Every 6 (Six) Hours., Disp: , Rfl:   •  atenolol (TENORMIN) 25 MG tablet, Take 25 mg by mouth Daily., Disp: , Rfl:   •  cyclobenzaprine (FLEXERIL) 10 MG tablet, Take  by mouth At Night As Needed., Disp: , Rfl:   •  furosemide (LASIX) 40 MG tablet, Take 40 mg by mouth Daily., Disp: , Rfl:   •  hydrOXYzine (ATARAX) 25 MG tablet, Take 25 mg by mouth Daily. x 30 days, Disp: , Rfl:   •  pantoprazole (PROTONIX) 40 MG EC tablet, Take 40 mg by mouth Daily., Disp: , Rfl:   •  simvastatin (ZOCOR) 40 MG tablet, Take 40 mg by mouth Daily., Disp: , Rfl:   •  vitamin D (ERGOCALCIFEROL) 11924 UNITS capsule capsule, Take 50,000 Units by mouth 1 (One) Time Per Week., Disp: , Rfl:   •  budesonide (RHINOCORT AQUA) 32 MCG/ACT nasal spray, 2  sprays into each nostril Every Morning. x 9 days, Disp: , Rfl:   •  fexofenadine (ALLEGRA ALLERGY) 60 MG tablet, Take 60 mg by mouth 2 (Two) Times a Day., Disp: , Rfl:   •  ipratropium (ATROVENT) 0.03 % nasal spray, 2 sprays into each nostril Daily As Needed. x 5 days for congestions, Disp: , Rfl:   •  sucralfate (CARAFATE) 1 G tablet, Take 1 tablet by mouth As Needed., Disp: , Rfl:     Allergies   Allergen Reactions   • Amoxicillin    • Penicillins    • Cefprozil Rash       Family History   Problem Relation Age of Onset   • Diabetes Father    • Hypertension Father    • Heart disease Father      Ischemic   • Heart attack Father      MI in 40s       Social History     Social History   • Marital status:        Social History Main Topics   • Smoking status: Former Smoker     Years: 20.00   • Smokeless tobacco: Not on file   • Alcohol use No   • Drug use: Not on file   • Sexual activity: Not on file      Comment:          Review of Systems   Constitutional: Negative for activity change, appetite change, chills and fever.   HENT: Negative for hearing loss, nosebleeds and trouble swallowing.    Respiratory: Positive for wheezing.    Cardiovascular: Negative for chest pain, palpitations and leg swelling.   Gastrointestinal: Positive for abdominal pain (radiates to the back), blood in stool, nausea and vomiting. Negative for abdominal distention, anal bleeding, anorexia, change in bowel habit, constipation, diarrhea and rectal pain.   Endocrine: Negative for cold intolerance, heat intolerance, polydipsia and polyuria.   Genitourinary: Positive for hematuria. Negative for decreased urine volume, difficulty urinating, dysuria, enuresis, frequency and urgency.   Musculoskeletal: Negative for arthralgias, back pain, gait problem, myalgias and neck pain.   Skin: Negative for pallor, rash and wound.   Allergic/Immunologic: Negative for immunocompromised state.   Neurological: Negative for dizziness, seizures,  weakness, light-headedness, numbness and headaches.   Psychiatric/Behavioral: Negative for agitation and behavioral problems. The patient is not nervous/anxious.        Physical Exam   Constitutional: He is oriented to person, place, and time and well-developed, well-nourished, and in no distress. Vital signs are normal. No distress.       HENT:   Head: Normocephalic and atraumatic.   Eyes: Conjunctivae and EOM are normal. Pupils are equal, round, and reactive to light.   Neck: Normal range of motion. Neck supple. No JVD present. No tracheal deviation present. No thyromegaly present.   Cardiovascular: Normal rate, regular rhythm and normal heart sounds.    Pulmonary/Chest: Effort normal and breath sounds normal. No stridor. No respiratory distress. He has no wheezes. He has no rales. He exhibits no tenderness.   Abdominal: Soft. Normal appearance and bowel sounds are normal. He exhibits no distension and no mass. There is no hepatomegaly. There is tenderness (mild RUQ pain). There is no rebound and no guarding. No hernia.       Lymphadenopathy:     He has no cervical adenopathy.     He has no axillary adenopathy.   Neurological: He is oriented to person, place, and time.   Skin: Skin is warm, dry and intact. No lesion and no rash noted. He is not diaphoretic. No erythema.   Psychiatric: Mood, memory, affect and judgment normal.   Nursing note and vitals reviewed.      ASSESSMENT    Dk was seen today for cholelithiasis and hernia.    Diagnoses and all orders for this visit:    Calculus of gallbladder with chronic cholecystitis without obstruction  -     Case Request; Standing  -     Comprehensive Metabolic Panel; Future  -     ECG 12 Lead; Future  -     chlorhexidine (PERIDEX) 0.12 % solution 15 mL; Apply 15 mL to the mouth or throat Every 12 (Twelve) Hours.  -     sodium chloride 0.9 % flush 1-10 mL; Infuse 1-10 mL into a venous catheter As Needed for line care.  -     lactated ringers infusion; Infuse 100  mL/hr into a venous catheter Continuous.  -     levoFLOXacin (LEVAQUIN) 500 mg in sodium chloride 0.9 % 150 mL IVPB; Infuse 500 mg into a venous catheter 1 (One) Time.  -     Case Request    Other orders  -     Follow anesthesia standing orders.; Future  -     Provide instructions to patient on NPO status  -     Follow anesthesia standing orders.; Standing  -     Verify NPO Status; Standing  -     Obtain informed consent; Standing  -     SCD (sequential compression device)- to be placed on patient in Pre-op; Standing  -     Clip operative site; Standing  -     Have patient void prior to transfer; Standing  -     Insert Peripheral IV; Standing  -     Saline Lock & Maintain IV Access; Standing      PLAN    1. Laparoscopic possible open cholecystectomy    This procedure involves removing the gallbladder.  The surgery may be done laparoscopically. Laparoscopic surgery is done using a scope and hollow tube(s) called ports. These are inserted through small cuts in the abdomen. A scope is a thin, lighted instrument with a camera attached. Tools are passed through the ports. Carbon dioxide gas is pumped into the abdomen to create workspace.   If the surgery cannot be performed with a scope, it may be done through a larger cut. This occurs 2% of the time.  The gall bladder will be removed after it is  from the liver, bile duct, and surrounding arteries. An x-ray of the bile ducts is usually performed with contrast dye injected into the ducts.  If stones are found, another procedure may be required to remove them.  A drain may rarely be inserted to keep fluid from building up in the treatment area.   Alternatives include:  * Watching and waiting with no surgery  * Removing the gallbladder through one large incision made in the abdomen.  Prognosis and possible risks if no operation/procedure is performed:  Pain, infection, inflammation, and/or stones may continue or get worse.  Risks of the operation/procedure:  *  Exposure to radiation. Pregnant women and women of childbearing age should talk with their doctor about this.  * Pain, numbness, swelling, weakness or scarring where tissue is cut.  * The gas used in laparoscopic procedures to inflate the abdomen can become trapped in tissues. Gas in the bloodstream can dangerously affect blood flow and  heart function.  * The procedure may not cure or relieve your condition or symptoms. They may come back and even worsen.  * You may need additional tests or treatment.  * Bleeding. You may need blood transfusions or other treatments. This may be discovered during the procedure, or later.  * Embolism. An embolism is an object that moves through your body in your bloodstream. It can be an air bubble, a blood clot, a piece of fat or other material. It can block a blood vessel. This can lead to stroke, pulmonary embolism (blockage of the main artery of the lung), or injury to organs or extremities.  * Reactions to dye used for imaging. These may include hives, swelling of the face and/or throat, difficulty breathing, and kidney failure.  * Retained stones in bile ducts.  * Wound infection, poor healing or reopening. Blood or clear fluid can also collect at the wound site(s).  * Damage to the bile ducts or nearby structures. This may be discovered during the procedure, or later.  * Incisional hernia. Weak scar tissue may allow tissue to bulge through the cut.  * The instrument(s) placed in your abdomen can cause injuries to nearby structures.  * Death.  Expectations:  * This procedure may relieve or prevent infection, inflammation, and/or pain from stones or blockage of bile ducts.  * If you are on metformin, it will need to be held for 48 hours after surgery.    Signed by Oswaldo Cates MD

## 2017-03-06 NOTE — OP NOTE
CHOLECYSTECTOMY LAPAROSCOPIC  Procedure Note    Dk Goldstein  3/6/2017    Pre-op Diagnosis:   Calculus of gallbladder with chronic cholecystitis without obstruction [K80.10]    Post-op Diagnosis:     Post-Op Diagnosis Codes:     * Calculus of gallbladder with chronic cholecystitis without obstruction [K80.10]    Procedure/CPT® Codes: 58523, 25753-94    Procedure(s):  LAPAROSCOPIC CHOLECYSTECTOMY WITH INTRA OPERATIVE CHOLANGIOGRAM       Surgeon(s):  Oswaldo Cates MD    Anesthesia: General    Staff:   Circulator: Marcello Demarco RN  Scrub Person: Nusrat Alvarez  Endo Technician: Colleen Landa CST  Assistant: Irina Dunham CSA    Estimated Blood Loss: 10 cc    Specimens:                  ID Type Source Tests Collected by Time Destination   A : gallbladder and contents Tissue Gallbladder TISSUE EXAM Oswaldo Cates MD 3/6/2017 1442          Drains:    None       Findings: Stones, normal IOC    Complications: None      INDICATION:  This is a 48-year-old with epigastric and right upper quadrant abdominal pain.  Positive ultrasound for stones. Taken to the operating room for laparoscopic cholecystectomy.    DESCRIPTION:  The patient was brought to the operating room and placed supine on the operating table. After adequate general endotracheal anesthesia the abdominal area was prepped and draped in a sterile manner. A transverse incision was made slightly to the right of the midline in the epigastrium subcostally. A 5 mm XCEL trocar was uneventfully passed into the abdomen under direct vision with no visceral injury. The abdomen was insufflated to a total of 15 mmHg, 4.8 L of CO2. A 5 mm laparoscope revealed an excellent view of the upper and lower abdomens. A longitudinal skin incision was made at the umbilicus and a 5 mm trocar was placed under direct vision with no visceral injury. The camera was then moved to the umbilical port site and an excellent view of the upper abdomen was obtained. An incision was  made at the tip of the 12th rib on the right side and carried into the subcutaneous tissue. A 5 mm trocar was uneventfully passed into the abdomen under direct vision with no visceral injury.  A fourth 5 mm trocar was placed in the midclavicular line subcostally on the right side under direct vision.  The bed was then tilted in reverse Trendelenburg and tipped to the left. He tolerated the positioning and insufflation without difficulty. The gallbladder was retracted upwards and outwards by grasping the infundibulum with atraumatic graspers passed through the lateral ports. The peritoneum around the infundibulum was taken down for a distance of 1/3 of the length of the gallbladder on the anterior and posterior sides. The cystic duct and artery came into view as did the 5th segment of the liver behind these structures. In the process a small hole in the infundibulum of the gallbladder was noted precluding needle entry into the infundibulum for cholangiography.    A Gutierrez clamp was placed across the infundibulum proximal to the cholecystotomy, and tubing was passes through the cholecystotomy and secured with a clip. A fluoroscopic cholangiogram was performed that demonstrated proper identification of the cystic duct, good filling proximally and distally, intact bile ducts, no stones in the common duct, and good emptying into the duodenum.     The cholangiocath was removed and the cystic duct was doubly clipped and divided. For added security, a PDS endoloop was placed beyond the cystic duct clip. The cystic artery was doubly clipped and divided in all branches. The gallbladder was then dissected out of the liver bed using electrocautery. Once it had been nearly completely excised, pressure in the abdomen was reduced to 8 mmHg with no further bleeding being noted from the liver bed. The remainder of the gallbladder was dissected free.  It was placed into an Endobag and brought out intact through the epigastric port.  All trochars were replaced and all areas were visualized for bleeding and bile leak. None was seen. He was then placed supine. Trocars were removed and the abdomen was allowed to flatten. All port sites were closed with 4-0 subcuticular on the skin and the procedure was terminated. He tolerated it well. Sponge and needle counts were correct. He was returned to recovery in satisfactory condition.

## 2017-03-06 NOTE — ANESTHESIA POSTPROCEDURE EVALUATION
Patient: Dk Goldstein    Procedure Summary     Date Anesthesia Start Anesthesia Stop Room / Location    03/06/17 1313 1515 [facility] / [facility]       Procedure Diagnosis Surgeon Provider    LAPAROSCOPIC POSSIBLE OPEN CHOLECYSTECTOMY WITH INTRA OPERATIVE CHOLANGIOGRAM    (N/A Abdomen) Calculus of gallbladder with chronic cholecystitis without obstruction  (Calculus of gallbladder with chronic cholecystitis without obstruction [K80.10]) MD Angie Au CRNA          Anesthesia Type: general  Last vitals  BP      Temp      Pulse     Resp      SpO2        Post Anesthesia Care and Evaluation    Patient location during evaluation: bedside  Patient participation: complete - patient participated  Level of consciousness: awake and awake and alert  Pain management: adequate  Airway patency: patent  Anesthetic complications: No anesthetic complications  PONV Status: none  Cardiovascular status: acceptable  Respiratory status: acceptable  Hydration status: acceptable

## 2017-03-06 NOTE — ADDENDUM NOTE
Addendum  created 03/06/17 1714 by Cain Schumacher MD    Anesthesia Intra Meds edited, Anesthesia Review and Sign - Ready for Procedure, Problem List reviewed, Sign clinical note

## 2017-03-09 LAB
LAB AP CASE REPORT: NORMAL
Lab: NORMAL
PATH REPORT.FINAL DX SPEC: NORMAL
PATH REPORT.GROSS SPEC: NORMAL

## 2017-03-15 ENCOUNTER — OFFICE VISIT (OUTPATIENT)
Dept: SURGERY | Facility: CLINIC | Age: 47
End: 2017-03-15

## 2017-03-15 VITALS
DIASTOLIC BLOOD PRESSURE: 88 MMHG | WEIGHT: 292 LBS | SYSTOLIC BLOOD PRESSURE: 136 MMHG | HEIGHT: 67 IN | BODY MASS INDEX: 45.83 KG/M2

## 2017-03-15 DIAGNOSIS — Z90.49 HX OF CHOLECYSTECTOMY: Primary | ICD-10-CM

## 2017-03-15 PROCEDURE — 99024 POSTOP FOLLOW-UP VISIT: CPT | Performed by: SURGERY

## 2017-03-15 RX ORDER — LEVOTHYROXINE SODIUM 0.03 MG/1
25 TABLET ORAL DAILY
COMMUNITY
Start: 2017-03-15 | End: 2017-10-06

## 2017-03-15 RX ORDER — LISINOPRIL AND HYDROCHLOROTHIAZIDE 25; 20 MG/1; MG/1
TABLET ORAL
COMMUNITY
Start: 2017-03-14 | End: 2017-07-15

## 2017-03-15 NOTE — PROGRESS NOTES
CHIEF COMPLAINT:   Chief Complaint   Patient presents with   • Post-op Follow-up     post operative laparoscopic cholecystectomy       This patient presents for a post-operative visit after undergoing a laparoscopic cholecystectomy.    Patient reports no problems. Eating well without any significant nausea. Having good bowel function. No problems with constipation or diarrhea. No urinary complaints. Denies fever. Ambulating well and slowly returning to normal activities. Minor incisional pain. Recent dx of hypothyroidism.    PATHOLOGY:   Final Diagnosis   GALLBLADDER:  CHRONIC CHOLECYSTITIS.  CHOLELITHIASIS.  CHOLESTEROLOSIS.   Electronically signed by Michael Rasmussen MD on 3/9/2017 at 1115       PHYSICAL EXAM:    ABD: Incisions are healing well without any erythema or signs of infection.    ASSESSMENT:    Dk was seen today for post-op follow-up.    Diagnoses and all orders for this visit:    Hx of cholecystectomy      PLAN:    1.The patient will follow-up on a prn basis unless there are any problems.  2. May shower.   3. May return to normal activity without restrictions.    Signed by Oswaldo Cates MD

## 2017-10-03 PROCEDURE — 87081 CULTURE SCREEN ONLY: CPT | Performed by: NURSE PRACTITIONER

## 2017-10-06 PROBLEM — E03.9 HYPOTHYROIDISM: Status: ACTIVE | Noted: 2017-05-12

## 2017-12-01 ENCOUNTER — HOSPITAL ENCOUNTER (EMERGENCY)
Facility: HOSPITAL | Age: 47
Discharge: HOME | End: 2017-12-02
Attending: EMERGENCY MEDICINE | Admitting: EMERGENCY MEDICINE

## 2017-12-01 DIAGNOSIS — I10 ESSENTIAL HYPERTENSION: Primary | ICD-10-CM

## 2017-12-01 DIAGNOSIS — R42 VERTIGO: ICD-10-CM

## 2017-12-01 DIAGNOSIS — J01.00 ACUTE MAXILLARY SINUSITIS, RECURRENCE NOT SPECIFIED: ICD-10-CM

## 2017-12-01 PROCEDURE — 99283 EMERGENCY DEPT VISIT LOW MDM: CPT

## 2017-12-01 PROCEDURE — 93005 ELECTROCARDIOGRAM TRACING: CPT | Performed by: EMERGENCY MEDICINE

## 2017-12-01 RX ORDER — CLONIDINE HYDROCHLORIDE 0.1 MG/1
0.1 TABLET ORAL ONCE
Status: DISCONTINUED | OUTPATIENT
Start: 2017-12-01 | End: 2017-12-02 | Stop reason: HOSPADM

## 2017-12-01 RX ORDER — SODIUM CHLORIDE 0.9 % (FLUSH) 0.9 %
10 SYRINGE (ML) INJECTION AS NEEDED
Status: DISCONTINUED | OUTPATIENT
Start: 2017-12-01 | End: 2017-12-02 | Stop reason: HOSPADM

## 2017-12-02 ENCOUNTER — APPOINTMENT (OUTPATIENT)
Dept: CT IMAGING | Facility: HOSPITAL | Age: 47
End: 2017-12-02

## 2017-12-02 ENCOUNTER — APPOINTMENT (OUTPATIENT)
Dept: GENERAL RADIOLOGY | Facility: HOSPITAL | Age: 47
End: 2017-12-02

## 2017-12-02 VITALS
HEIGHT: 71 IN | RESPIRATION RATE: 18 BRPM | OXYGEN SATURATION: 97 % | WEIGHT: 283.25 LBS | SYSTOLIC BLOOD PRESSURE: 139 MMHG | TEMPERATURE: 98 F | HEART RATE: 70 BPM | BODY MASS INDEX: 39.65 KG/M2 | DIASTOLIC BLOOD PRESSURE: 70 MMHG

## 2017-12-02 LAB
ALBUMIN SERPL-MCNC: 4.1 G/DL (ref 3.4–4.8)
ALBUMIN/GLOB SERPL: 1.2 G/DL (ref 1.1–1.8)
ALP SERPL-CCNC: 55 U/L (ref 38–126)
ALT SERPL W P-5'-P-CCNC: 52 U/L (ref 21–72)
ANION GAP SERPL CALCULATED.3IONS-SCNC: 11 MMOL/L (ref 5–15)
AST SERPL-CCNC: 43 U/L (ref 17–59)
BASOPHILS # BLD AUTO: 0.04 10*3/MM3 (ref 0–0.2)
BASOPHILS NFR BLD AUTO: 0.5 % (ref 0–2)
BILIRUB SERPL-MCNC: 0.7 MG/DL (ref 0.2–1.3)
BUN BLD-MCNC: 12 MG/DL (ref 7–21)
BUN/CREAT SERPL: 11.2 (ref 7–25)
CALCIUM SPEC-SCNC: 9.4 MG/DL (ref 8.4–10.2)
CHLORIDE SERPL-SCNC: 99 MMOL/L (ref 95–110)
CO2 SERPL-SCNC: 27 MMOL/L (ref 22–31)
CREAT BLD-MCNC: 1.07 MG/DL (ref 0.7–1.3)
DEPRECATED RDW RBC AUTO: 39.2 FL (ref 35.1–43.9)
EOSINOPHIL # BLD AUTO: 0.16 10*3/MM3 (ref 0–0.7)
EOSINOPHIL NFR BLD AUTO: 2 % (ref 0–7)
ERYTHROCYTE [DISTWIDTH] IN BLOOD BY AUTOMATED COUNT: 12.9 % (ref 11.5–14.5)
GFR SERPL CREATININE-BSD FRML MDRD: 74 ML/MIN/1.73 (ref 60–147)
GLOBULIN UR ELPH-MCNC: 3.4 GM/DL (ref 2.3–3.5)
GLUCOSE BLD-MCNC: 102 MG/DL (ref 60–100)
HCT VFR BLD AUTO: 38.1 % (ref 39–49)
HGB BLD-MCNC: 13.1 G/DL (ref 13.7–17.3)
HOLD SPECIMEN: NORMAL
HOLD SPECIMEN: NORMAL
IMM GRANULOCYTES # BLD: 0.02 10*3/MM3 (ref 0–0.02)
IMM GRANULOCYTES NFR BLD: 0.3 % (ref 0–0.5)
INR PPP: 0.95 (ref 0.8–1.2)
LYMPHOCYTES # BLD AUTO: 2.64 10*3/MM3 (ref 0.6–4.2)
LYMPHOCYTES NFR BLD AUTO: 33.4 % (ref 10–50)
MCH RBC QN AUTO: 29 PG (ref 26.5–34)
MCHC RBC AUTO-ENTMCNC: 34.4 G/DL (ref 31.5–36.3)
MCV RBC AUTO: 84.3 FL (ref 80–98)
MONOCYTES # BLD AUTO: 0.67 10*3/MM3 (ref 0–0.9)
MONOCYTES NFR BLD AUTO: 8.5 % (ref 0–12)
NEUTROPHILS # BLD AUTO: 4.38 10*3/MM3 (ref 2–8.6)
NEUTROPHILS NFR BLD AUTO: 55.3 % (ref 37–80)
NT-PROBNP SERPL-MCNC: 71.9 PG/ML (ref 0–450)
PLATELET # BLD AUTO: 170 10*3/MM3 (ref 150–450)
PMV BLD AUTO: 10.5 FL (ref 8–12)
POTASSIUM BLD-SCNC: 3.7 MMOL/L (ref 3.5–5.1)
PROT SERPL-MCNC: 7.5 G/DL (ref 6.3–8.6)
PROTHROMBIN TIME: 12.6 SECONDS (ref 11.1–15.3)
RBC # BLD AUTO: 4.52 10*6/MM3 (ref 4.37–5.74)
SODIUM BLD-SCNC: 137 MMOL/L (ref 137–145)
TROPONIN I SERPL-MCNC: <0.012 NG/ML
WBC NRBC COR # BLD: 7.91 10*3/MM3 (ref 3.2–9.8)
WHOLE BLOOD HOLD SPECIMEN: NORMAL
WHOLE BLOOD HOLD SPECIMEN: NORMAL

## 2017-12-02 PROCEDURE — 83880 ASSAY OF NATRIURETIC PEPTIDE: CPT | Performed by: EMERGENCY MEDICINE

## 2017-12-02 PROCEDURE — 84484 ASSAY OF TROPONIN QUANT: CPT | Performed by: EMERGENCY MEDICINE

## 2017-12-02 PROCEDURE — 93010 ELECTROCARDIOGRAM REPORT: CPT | Performed by: INTERNAL MEDICINE

## 2017-12-02 PROCEDURE — 70450 CT HEAD/BRAIN W/O DYE: CPT

## 2017-12-02 PROCEDURE — 85025 COMPLETE CBC W/AUTO DIFF WBC: CPT | Performed by: EMERGENCY MEDICINE

## 2017-12-02 PROCEDURE — 71010 HC CHEST PA OR AP: CPT

## 2017-12-02 PROCEDURE — 80053 COMPREHEN METABOLIC PANEL: CPT | Performed by: EMERGENCY MEDICINE

## 2017-12-02 PROCEDURE — 85610 PROTHROMBIN TIME: CPT | Performed by: EMERGENCY MEDICINE

## 2017-12-02 PROCEDURE — 93005 ELECTROCARDIOGRAM TRACING: CPT | Performed by: EMERGENCY MEDICINE

## 2017-12-02 RX ORDER — CLONIDINE HYDROCHLORIDE 0.1 MG/1
0.1 TABLET ORAL 2 TIMES DAILY PRN
Qty: 30 TABLET | Refills: 0 | Status: SHIPPED | OUTPATIENT
Start: 2017-12-02

## 2017-12-02 RX ORDER — ATENOLOL 25 MG/1
25 TABLET ORAL 2 TIMES DAILY
Qty: 60 TABLET | Refills: 2 | Status: SHIPPED | OUTPATIENT
Start: 2017-12-02

## 2017-12-02 RX ORDER — MECLIZINE HYDROCHLORIDE 25 MG/1
25 TABLET ORAL EVERY 6 HOURS PRN
Qty: 20 TABLET | Refills: 0 | Status: SHIPPED | OUTPATIENT
Start: 2017-12-02

## 2017-12-02 NOTE — ED PROVIDER NOTES
Subjective   HPI Comments: Patient presents with a headache and dizziness today.  Patient notes that these symptoms are subacute.  Patient has had these on and off for several months.  Patient notes that they are getting worse as in today.  Patient does note elevated blood pressures anytime he has these symptoms.  Patient notes his normal blood pressures are 130s over 80s.  Patient notes today that his blood pressures up to 180 systolic.  Patient states that he gets a spinning in his head and very lightheaded.  Patient does not feel like he is given a pass out though is very dizzy.  Patient's symptoms have improved since the episode this evening.  Patient was in the car at the time.  There is been no nausea vomiting.  Has had chest tightness subacute for several weeks to months per patient history and notes he has not had this worked up.  No significant chest pain at this time.  No neurologic symptoms are noted.      History provided by:  Patient      Review of Systems   Constitutional: Negative.  Negative for appetite change, chills and fever.   HENT: Negative for congestion.    Eyes: Negative.  Negative for photophobia and visual disturbance.   Respiratory: Negative.  Negative for cough, chest tightness and shortness of breath.    Cardiovascular: Negative.  Negative for chest pain and palpitations.   Gastrointestinal: Negative.  Negative for abdominal pain, constipation, diarrhea, nausea and vomiting.   Endocrine: Negative.    Genitourinary: Negative.  Negative for decreased urine volume, dysuria, flank pain and hematuria.   Musculoskeletal: Positive for gait problem. Negative for arthralgias, back pain, myalgias, neck pain and neck stiffness.   Skin: Negative.  Negative for pallor.   Neurological: Positive for dizziness, light-headedness and headaches. Negative for syncope, weakness and numbness.   Psychiatric/Behavioral: Negative.  Negative for confusion and suicidal ideas. The patient is not nervous/anxious.   "      Past Medical History:   Diagnosis Date   • Acute pharyngitis    • Acute sinusitis    • Allergic rhinitis    • Benign hypertension    • Blood glucose abnormal    • Blood in urine    • Diverticular disease    • Encounter for routine adult health examination     Adult health examination   • Essential hypertension    • Gastroesophageal reflux disease    • Hyperlipidemia    • Muscle pain    • Sleep apnea in adult     states he only uses c-pap when he \"feels really tired\"   • Upper respiratory infection    • Vitamin D deficiency    • Wheezing    • Worried well        Allergies   Allergen Reactions   • Amoxicillin Hives   • Lisinopril    • Penicillins Hives   • Cefprozil Rash       Past Surgical History:   Procedure Laterality Date   • CHOLECYSTECTOMY N/A 3/6/2017    Procedure: LAPAROSCOPIC POSSIBLE OPEN CHOLECYSTECTOMY WITH INTRA OPERATIVE CHOLANGIOGRAM   ;  Surgeon: Oswaldo Cates MD;  Location: [facility] OR;  Service:    • COLONOSCOPY N/A 2/6/2017    Procedure: COLONOSCOPY;  Surgeon: Juan Luis Swann DO;  Location: [facility] ENDOSCOPY;  Service:    • ENDOSCOPY N/A 2/6/2017    Procedure: ESOPHAGOGASTRODUODENOSCOPY;  Surgeon: Juan Luis Swann DO;  Location: [facility] ENDOSCOPY;  Service:        Family History   Problem Relation Age of Onset   • Diabetes Father    • Hypertension Father    • Heart disease Father      Ischemic   • Heart attack Father      MI in 40s       Social History     Social History   • Marital status:      Spouse name: N/A   • Number of children: N/A   • Years of education: N/A     Social History Main Topics   • Smoking status: Former Smoker     Years: 20.00     Quit date: 2011   • Smokeless tobacco: Never Used   • Alcohol use No   • Drug use: No   • Sexual activity: Defer      Comment:      Other Topics Concern   • Not on file     Social History Narrative           Objective   Physical Exam   Constitutional: He is oriented to person, place, and time. He appears well-developed and " well-nourished. No distress.   Normal physical exam without nystagmus noted.  Symptoms not reproducible.   HENT:   Head: Normocephalic and atraumatic.   Eyes: Conjunctivae and EOM are normal.   Neck: Normal range of motion. Neck supple. No JVD present.   Cardiovascular: Normal rate, regular rhythm, normal heart sounds and intact distal pulses.  Exam reveals no gallop and no friction rub.    No murmur heard.  Pulmonary/Chest: Effort normal. No respiratory distress. He has no wheezes. He has no rales. He exhibits no tenderness.   Abdominal: Soft. Bowel sounds are normal. He exhibits no distension and no mass. There is no tenderness. There is no rebound and no guarding.   Musculoskeletal: Normal range of motion.   Neurological: He is alert and oriented to person, place, and time.   Skin: Skin is warm and dry.   Psychiatric: He has a normal mood and affect. His behavior is normal. Judgment and thought content normal.   Nursing note and vitals reviewed.      Procedures         ED Course  ED Course      Labs Reviewed   COMPREHENSIVE METABOLIC PANEL - Abnormal; Notable for the following:        Result Value    Glucose 102 (*)     All other components within normal limits   CBC WITH AUTO DIFFERENTIAL - Abnormal; Notable for the following:     Hemoglobin 13.1 (*)     Hematocrit 38.1 (*)     All other components within normal limits   TROPONIN (IN-HOUSE) - Normal   BNP (IN-HOUSE) - Normal   PROTIME-INR - Normal    Narrative:     Therapeutic range for most indications is 2.0-3.0 INR,  or 2.5-3.5 for mechanical heart valves.   RAINBOW DRAW    Narrative:     The following orders were created for panel order [place] Draw.  Procedure                               Abnormality         Status                     ---------                               -----------         ------                     Light Blue Top[[id number]]                                   Final result               Green Top (Gel)[[id number]]                                   Final result               Lavender Top[[id number]]                                     Final result               Gold Top - SST[[id number]]                                   Final result                 Please view results for these tests on the individual orders.   TROPONIN (IN-HOUSE)   CBC AND DIFFERENTIAL    Narrative:     The following orders were created for panel order CBC & Differential.  Procedure                               Abnormality         Status                     ---------                               -----------         ------                     CBC Auto Differential[[id number]]        Abnormal            Final result                 Please view results for these tests on the individual orders.   LIGHT BLUE TOP   GREEN TOP   LAVENDER TOP   GOLD TOP - SST       CT Head Without Contrast   Final Result   No acute intracranial abnormality.      Electronically signed by:  Yusuf Ogden MD  12/2/2017 12:39 AM   CST Workstation: [id number]      XR Chest 1 View   Final Result   No acute cardiopulmonary abnormality.      Electronically signed by:  Yusuf Ogden MD  12/2/2017 12:34 AM   CST Workstation: [employer]        Workup without findings of intercranial pathology electrolyte problems or any cardiac issues.  Patient with long-standing chest pain notes stress test ×6 though this was 6 years ago was the most recent.  Patient instructed with his long-term chest issues follow with Dr. Blackwood for further evaluation and management.  Patient will check his blood pressures at home.  We'll increase his atenolol from 25 mg to 25 mg twice daily as he is having problems at night.  He was other blood pressure medications.  Patient also given clonidine present emergency medication should his symptoms get worse on an intermittent basis.          MDM    Final diagnoses:   Essential hypertension   Vertigo            Jorge Nuno MD  12/02/17 0138

## 2018-01-05 ENCOUNTER — APPOINTMENT (OUTPATIENT)
Dept: GENERAL RADIOLOGY | Facility: HOSPITAL | Age: 48
End: 2018-01-05

## 2018-01-05 ENCOUNTER — HOSPITAL ENCOUNTER (EMERGENCY)
Facility: HOSPITAL | Age: 48
Discharge: HOME | End: 2018-01-06
Attending: EMERGENCY MEDICINE | Admitting: EMERGENCY MEDICINE

## 2018-01-05 ENCOUNTER — APPOINTMENT (OUTPATIENT)
Dept: CT IMAGING | Facility: HOSPITAL | Age: 48
End: 2018-01-05

## 2018-01-05 DIAGNOSIS — K57.32 DIVERTICULITIS OF LARGE INTESTINE WITHOUT PERFORATION OR ABSCESS WITHOUT BLEEDING: Primary | ICD-10-CM

## 2018-01-05 LAB
ALBUMIN SERPL-MCNC: 4.5 G/DL (ref 3.4–4.8)
ALBUMIN/GLOB SERPL: 1.3 G/DL (ref 1.1–1.8)
ALP SERPL-CCNC: 58 U/L (ref 38–126)
ALT SERPL W P-5'-P-CCNC: 53 U/L (ref 21–72)
ANION GAP SERPL CALCULATED.3IONS-SCNC: 13 MMOL/L (ref 5–15)
AST SERPL-CCNC: 39 U/L (ref 17–59)
BASOPHILS # BLD AUTO: 0.03 10*3/MM3 (ref 0–0.2)
BASOPHILS NFR BLD AUTO: 0.3 % (ref 0–2)
BILIRUB SERPL-MCNC: 1 MG/DL (ref 0.2–1.3)
BILIRUB UR QL STRIP: NEGATIVE
BUN BLD-MCNC: 17 MG/DL (ref 7–21)
BUN/CREAT SERPL: 15.2 (ref 7–25)
CALCIUM SPEC-SCNC: 9.6 MG/DL (ref 8.4–10.2)
CHLORIDE SERPL-SCNC: 102 MMOL/L (ref 95–110)
CLARITY UR: CLEAR
CO2 SERPL-SCNC: 25 MMOL/L (ref 22–31)
COLOR UR: YELLOW
CREAT BLD-MCNC: 1.12 MG/DL (ref 0.7–1.3)
DEPRECATED RDW RBC AUTO: 39.3 FL (ref 35.1–43.9)
EOSINOPHIL # BLD AUTO: 0.12 10*3/MM3 (ref 0–0.7)
EOSINOPHIL NFR BLD AUTO: 1 % (ref 0–7)
ERYTHROCYTE [DISTWIDTH] IN BLOOD BY AUTOMATED COUNT: 13 % (ref 11.5–14.5)
GFR SERPL CREATININE-BSD FRML MDRD: 70 ML/MIN/1.73 (ref 60–147)
GLOBULIN UR ELPH-MCNC: 3.5 GM/DL (ref 2.3–3.5)
GLUCOSE BLD-MCNC: 87 MG/DL (ref 60–100)
GLUCOSE UR STRIP-MCNC: NEGATIVE MG/DL
HCT VFR BLD AUTO: 39.5 % (ref 39–49)
HGB BLD-MCNC: 13.8 G/DL (ref 13.7–17.3)
HGB UR QL STRIP.AUTO: NEGATIVE
IMM GRANULOCYTES # BLD: 0.01 10*3/MM3 (ref 0–0.02)
IMM GRANULOCYTES NFR BLD: 0.1 % (ref 0–0.5)
KETONES UR QL STRIP: NEGATIVE
LEUKOCYTE ESTERASE UR QL STRIP.AUTO: NEGATIVE
LIPASE SERPL-CCNC: 45 U/L (ref 23–300)
LYMPHOCYTES # BLD AUTO: 2.81 10*3/MM3 (ref 0.6–4.2)
LYMPHOCYTES NFR BLD AUTO: 24.2 % (ref 10–50)
MCH RBC QN AUTO: 29.4 PG (ref 26.5–34)
MCHC RBC AUTO-ENTMCNC: 34.9 G/DL (ref 31.5–36.3)
MCV RBC AUTO: 84.2 FL (ref 80–98)
MONOCYTES # BLD AUTO: 0.92 10*3/MM3 (ref 0–0.9)
MONOCYTES NFR BLD AUTO: 7.9 % (ref 0–12)
NEUTROPHILS # BLD AUTO: 7.71 10*3/MM3 (ref 2–8.6)
NEUTROPHILS NFR BLD AUTO: 66.5 % (ref 37–80)
NITRITE UR QL STRIP: NEGATIVE
PH UR STRIP.AUTO: <=5 [PH] (ref 5–9)
PLATELET # BLD AUTO: 194 10*3/MM3 (ref 150–450)
PMV BLD AUTO: 10.6 FL (ref 8–12)
POTASSIUM BLD-SCNC: 3.9 MMOL/L (ref 3.5–5.1)
PROT SERPL-MCNC: 8 G/DL (ref 6.3–8.6)
PROT UR QL STRIP: NEGATIVE
RBC # BLD AUTO: 4.69 10*6/MM3 (ref 4.37–5.74)
SODIUM BLD-SCNC: 140 MMOL/L (ref 137–145)
SP GR UR STRIP: 1.02 (ref 1–1.03)
UROBILINOGEN UR QL STRIP: NORMAL
WBC NRBC COR # BLD: 11.6 10*3/MM3 (ref 3.2–9.8)

## 2018-01-05 PROCEDURE — 74176 CT ABD & PELVIS W/O CONTRAST: CPT

## 2018-01-05 PROCEDURE — 99283 EMERGENCY DEPT VISIT LOW MDM: CPT

## 2018-01-05 PROCEDURE — 81003 URINALYSIS AUTO W/O SCOPE: CPT | Performed by: EMERGENCY MEDICINE

## 2018-01-05 PROCEDURE — 96374 THER/PROPH/DIAG INJ IV PUSH: CPT

## 2018-01-05 PROCEDURE — 25010000002 KETOROLAC TROMETHAMINE PER 15 MG: Performed by: EMERGENCY MEDICINE

## 2018-01-05 PROCEDURE — 85025 COMPLETE CBC W/AUTO DIFF WBC: CPT | Performed by: EMERGENCY MEDICINE

## 2018-01-05 PROCEDURE — 74022 RADEX COMPL AQT ABD SERIES: CPT

## 2018-01-05 PROCEDURE — 83690 ASSAY OF LIPASE: CPT | Performed by: EMERGENCY MEDICINE

## 2018-01-05 PROCEDURE — 80053 COMPREHEN METABOLIC PANEL: CPT | Performed by: EMERGENCY MEDICINE

## 2018-01-05 PROCEDURE — 96361 HYDRATE IV INFUSION ADD-ON: CPT

## 2018-01-05 RX ORDER — SODIUM CHLORIDE 9 MG/ML
125 INJECTION, SOLUTION INTRAVENOUS CONTINUOUS
Status: DISCONTINUED | OUTPATIENT
Start: 2018-01-05 | End: 2018-01-06 | Stop reason: HOSPADM

## 2018-01-05 RX ORDER — CIPROFLOXACIN 750 MG/1
750 TABLET, FILM COATED ORAL 2 TIMES DAILY
Qty: 20 TABLET | Refills: 0 | Status: SHIPPED | OUTPATIENT
Start: 2018-01-05 | End: 2018-01-15

## 2018-01-05 RX ORDER — HYDROCODONE BITARTRATE AND ACETAMINOPHEN 5; 325 MG/1; MG/1
2 TABLET ORAL EVERY 6 HOURS PRN
Qty: 10 TABLET | Refills: 0 | Status: SHIPPED | OUTPATIENT
Start: 2018-01-05 | End: 2021-08-26

## 2018-01-05 RX ORDER — SODIUM CHLORIDE 0.9 % (FLUSH) 0.9 %
10 SYRINGE (ML) INJECTION AS NEEDED
Status: DISCONTINUED | OUTPATIENT
Start: 2018-01-05 | End: 2018-01-06 | Stop reason: HOSPADM

## 2018-01-05 RX ORDER — METRONIDAZOLE 500 MG/1
500 TABLET ORAL 4 TIMES DAILY
Qty: 40 TABLET | Refills: 0 | Status: SHIPPED | OUTPATIENT
Start: 2018-01-05 | End: 2018-01-15

## 2018-01-05 RX ORDER — KETOROLAC TROMETHAMINE 30 MG/ML
30 INJECTION, SOLUTION INTRAMUSCULAR; INTRAVENOUS ONCE
Status: COMPLETED | OUTPATIENT
Start: 2018-01-05 | End: 2018-01-05

## 2018-01-05 RX ADMIN — Medication 10 ML: at 22:26

## 2018-01-05 RX ADMIN — KETOROLAC TROMETHAMINE 30 MG: 30 INJECTION, SOLUTION INTRAMUSCULAR; INTRAVENOUS at 23:02

## 2018-01-05 RX ADMIN — SODIUM CHLORIDE 125 ML/HR: 9 INJECTION, SOLUTION INTRAVENOUS at 23:02

## 2018-01-06 VITALS
TEMPERATURE: 98.1 F | DIASTOLIC BLOOD PRESSURE: 69 MMHG | WEIGHT: 282 LBS | OXYGEN SATURATION: 95 % | BODY MASS INDEX: 40.37 KG/M2 | HEART RATE: 85 BPM | RESPIRATION RATE: 18 BRPM | SYSTOLIC BLOOD PRESSURE: 126 MMHG | HEIGHT: 70 IN

## 2018-01-06 NOTE — ED PROVIDER NOTES
"Subjective   HPI Comments: 48yo male pmh significant htn/hyperlipidemia/hypothyroid/gerd/cholecystectomy presents ED c/o 2d hx 'sharp' left flank pain/radiating left lower abdomen/exac movement, inspiration/neg relieve factors/assoc urinary urgency.  ROS neg fever/n/v/d/chest pain/soa/dysuria/hematuria.    Patient is a 47 y.o. male presenting with flank pain.   Flank Pain   Pain location:  L flank  Pain quality: sharp    Pain radiates to:  LLQ  Pain severity:  Mild  Onset quality:  Sudden  Duration:  2 days  Timing:  Constant  Progression:  Waxing and waning  Chronicity:  New      Review of Systems   Constitutional: Negative.    HENT: Negative.    Eyes: Negative.    Respiratory: Negative.    Cardiovascular: Negative.    Gastrointestinal: Negative.    Endocrine: Negative.    Genitourinary: Positive for flank pain and urgency.   Skin: Negative.        Past Medical History:   Diagnosis Date   • Acute pharyngitis    • Acute sinusitis    • Allergic rhinitis    • Benign hypertension    • Blood glucose abnormal    • Blood in urine    • Diverticular disease    • Encounter for routine adult health examination     Adult health examination   • Essential hypertension    • Gastroesophageal reflux disease    • Hyperlipidemia    • Muscle pain    • Sleep apnea in adult     states he only uses c-pap when he \"feels really tired\"   • Upper respiratory infection    • Vitamin D deficiency    • Wheezing    • Worried well        Allergies   Allergen Reactions   • Amoxicillin Hives   • Lisinopril    • Penicillins Hives   • Cefprozil Rash       Past Surgical History:   Procedure Laterality Date   • CHOLECYSTECTOMY N/A 3/6/2017    Procedure: LAPAROSCOPIC POSSIBLE OPEN CHOLECYSTECTOMY WITH INTRA OPERATIVE CHOLANGIOGRAM   ;  Surgeon: Oswaldo Cates MD;  Location: [facility] OR;  Service:    • COLONOSCOPY N/A 2/6/2017    Procedure: COLONOSCOPY;  Surgeon: Juan Luis Swann DO;  Location: [facility] ENDOSCOPY;  Service:    • ENDOSCOPY N/A 2/6/2017    " Procedure: ESOPHAGOGASTRODUODENOSCOPY;  Surgeon: Juan Luis Swann DO;  Location: [facility] ENDOSCOPY;  Service:        Family History   Problem Relation Age of Onset   • Diabetes Father    • Hypertension Father    • Heart disease Father      Ischemic   • Heart attack Father      MI in 40s       Social History     Social History   • Marital status:      Spouse name: N/A   • Number of children: N/A   • Years of education: N/A     Social History Main Topics   • Smoking status: Former Smoker     Years: 20.00     Quit date: 2011   • Smokeless tobacco: Never Used   • Alcohol use No   • Drug use: No   • Sexual activity: Defer      Comment:      Other Topics Concern   • None     Social History Narrative           Objective   Physical Exam   Constitutional: He is oriented to person, place, and time. He appears well-developed and well-nourished.   HENT:   Head: Normocephalic and atraumatic.   Mouth/Throat: Oropharynx is clear and moist.   Eyes: Pupils are equal, round, and reactive to light.   Neck: Neck supple. No JVD present. No tracheal deviation present.   Cardiovascular: Normal rate, regular rhythm, normal heart sounds and intact distal pulses.  Exam reveals no gallop and no friction rub.    No murmur heard.  Pulmonary/Chest: Effort normal and breath sounds normal. He has no wheezes. He has no rales.   Abdominal: Soft. Normal appearance and bowel sounds are normal. There is tenderness in the left lower quadrant. There is no rigidity, no rebound, no guarding, no CVA tenderness, no tenderness at McBurney's point and negative Magdaleno's sign.   Musculoskeletal: He exhibits no edema.   Lymphadenopathy:     He has no cervical adenopathy.   Neurological: He is alert and oriented to person, place, and time.   Skin: Skin is warm and dry.   Nursing note and vitals reviewed.      Procedures         ED Course  ED Course      Labs Reviewed   CBC WITH AUTO DIFFERENTIAL - Abnormal; Notable for the following:        Result  Value    WBC 11.60 (*)     Monocytes, Absolute 0.92 (*)     All other components within normal limits   COMPREHENSIVE METABOLIC PANEL - Normal   LIPASE - Normal   URINALYSIS W/ CULTURE IF INDICATED - Normal    Narrative:     Urine microscopic not indicated.   CBC AND DIFFERENTIAL    Narrative:     The following orders were created for panel order CBC & Differential.  Procedure                               Abnormality         Status                     ---------                               -----------         ------                     CBC Auto Differential[[id number]]        Abnormal            Final result                 Please view results for these tests on the individual orders.     Ct Abdomen Pelvis Without Contrast    Result Date: 1/5/2018  Narrative: CT abdomen and pelvis without contrast on 1/5/2018 CLINICAL INDICATION: Left-sided back pain TECHNIQUE: Multiple axial images are obtained throughout the abdomen and pelvis without the administration of contrast. This study was performed with techniques to keep radiation doses as low as reasonably achievable, (ALARA). Total DLP is 969.6 mGy*cm. COMPARISON: None FINDINGS: Abdomen: The lung bases are clear. The patient is status post cholecystectomy. There are no renal or ureteral stones and no hydronephrosis. The unenhanced solid abdominal organs are unremarkable. Vascular calcifications are noted. There is no free air within the abdomen. There is fat stranding along the posterior aspect of the mid descending colon centered around a diverticulum consistent with acute diverticulitis. There is trace adjacent fluid in the left paracolic gutter. There is no evidence of abscess or perforation. The abdominal portion of the GI tract is otherwise unremarkable. Pelvis: There is diverticulosis. There is no free fluid in the pelvis. There is no pelvic adenopathy. The pelvic portion of the GI tract including the appendix is otherwise unremarkable. No bony abnormality is  noted.     Impression: 1. Acute descending colon diverticulitis without evidence of abscess or perforation. 2. No other acute abnormality. Electronically signed by:  Tae Vera  1/5/2018 11:34 PM CST Workstation: [id number]    Xr Abdomen 2 View With Chest 1 View    Result Date: 1/5/2018  Narrative: Acute abdominal series on 1/5/2018 CLINICAL INDICATION: Back pain COMPARISON: Chest x-ray from 12/2/2017 FINDINGS: CHEST: The lungs are clear. Cardiac, hilar and mediastinal contours are within normal limits. Pulmonary vascularity is within normal limits. ABDOMEN: There is no free air. Bowel gas pattern is unremarkable. No abnormal calcification or mass effect is noted. No bony abnormality is noted.     Impression: 1. No acute cardiopulmonary disease. 2. Nonspecific abdomen. Electronically signed by:  Tae Vera  1/5/2018 11:15 PM CST Workstation: [id number]                MDM    Final diagnoses:   Diverticulitis of large intestine without perforation or abscess without bleeding            Juan Pablo Han MD  01/05/18 [id number]

## 2018-01-06 NOTE — DISCHARGE INSTRUCTIONS
Clear liquid diet x24hrs  Return ED fever, abdominal pain, vomiting, bleeding, worse condition, other concerns

## 2018-05-21 ENCOUNTER — APPOINTMENT (OUTPATIENT)
Dept: GENERAL RADIOLOGY | Facility: HOSPITAL | Age: 48
End: 2018-05-21

## 2018-05-21 ENCOUNTER — HOSPITAL ENCOUNTER (EMERGENCY)
Facility: HOSPITAL | Age: 48
Discharge: HOME | End: 2018-05-22
Attending: EMERGENCY MEDICINE | Admitting: EMERGENCY MEDICINE

## 2018-05-21 DIAGNOSIS — M10.071 ACUTE IDIOPATHIC GOUT OF RIGHT FOOT: Primary | ICD-10-CM

## 2018-05-21 PROCEDURE — 99283 EMERGENCY DEPT VISIT LOW MDM: CPT

## 2018-05-21 PROCEDURE — 73610 X-RAY EXAM OF ANKLE: CPT

## 2018-05-21 RX ORDER — PRAVASTATIN SODIUM 40 MG
40 TABLET ORAL
COMMUNITY
Start: 2018-05-18 | End: 2019-05-19

## 2018-05-21 RX ORDER — HYDROXYZINE HYDROCHLORIDE 25 MG/1
25 TABLET, FILM COATED ORAL
COMMUNITY
End: 2022-12-30

## 2018-05-21 RX ORDER — ALLOPURINOL 100 MG/1
100 TABLET ORAL
COMMUNITY
Start: 2018-05-21 | End: 2019-05-22

## 2018-05-22 VITALS
DIASTOLIC BLOOD PRESSURE: 58 MMHG | BODY MASS INDEX: 39.06 KG/M2 | WEIGHT: 279 LBS | SYSTOLIC BLOOD PRESSURE: 105 MMHG | HEIGHT: 71 IN | OXYGEN SATURATION: 98 % | TEMPERATURE: 98.2 F | RESPIRATION RATE: 18 BRPM | HEART RATE: 66 BPM

## 2018-05-22 PROCEDURE — 96372 THER/PROPH/DIAG INJ SC/IM: CPT

## 2018-05-22 PROCEDURE — 25010000002 HYDROMORPHONE PER 4 MG: Performed by: EMERGENCY MEDICINE

## 2018-05-22 RX ORDER — HYDROCODONE BITARTRATE AND ACETAMINOPHEN 5; 325 MG/1; MG/1
1 TABLET ORAL EVERY 6 HOURS PRN
Qty: 13 TABLET | Refills: 0 | Status: SHIPPED | OUTPATIENT
Start: 2018-05-22 | End: 2021-08-26

## 2018-05-22 RX ORDER — HYDROCODONE BITARTRATE AND ACETAMINOPHEN 5; 325 MG/1; MG/1
1 TABLET ORAL EVERY 4 HOURS PRN
Status: DISCONTINUED | OUTPATIENT
Start: 2018-05-22 | End: 2018-05-22 | Stop reason: HOSPADM

## 2018-05-22 RX ADMIN — HYDROMORPHONE HYDROCHLORIDE 1 MG: 1 INJECTION, SOLUTION INTRAMUSCULAR; INTRAVENOUS; SUBCUTANEOUS at 00:05

## 2018-05-22 NOTE — ED PROVIDER NOTES
"Subjective   History of Present Illness  46 y/o male comes in w/ r foot pain secondary to gout. +uric acid test yesterday. Started on allopurinol. Already taking nsaid as well. No trauma. No fevers. No swelling. No rashes.   Review of Systems   Constitutional: Negative for fever.   HENT: Negative for congestion, nosebleeds, postnasal drip, sinus pressure and sore throat.    Respiratory: Negative for cough, chest tightness, shortness of breath, wheezing and stridor.    Gastrointestinal: Negative for abdominal pain, constipation, diarrhea, nausea and vomiting.   Genitourinary: Negative for dysuria, flank pain, frequency, hematuria, testicular pain and urgency.   Musculoskeletal:        Neg except for HPI   Skin: Negative for rash.   Neurological: Negative for syncope, light-headedness and headaches.   Psychiatric/Behavioral: Negative.        Past Medical History:   Diagnosis Date   • Acute pharyngitis    • Acute sinusitis    • Allergic rhinitis    • Benign hypertension    • Blood glucose abnormal    • Blood in urine    • Diverticular disease    • Encounter for routine adult health examination     Adult health examination   • Essential hypertension    • Gastroesophageal reflux disease    • Hyperlipidemia    • Muscle pain    • Sleep apnea in adult     states he only uses c-pap when he \"feels really tired\"   • Upper respiratory infection    • Vitamin D deficiency    • Wheezing    • Worried well        Allergies   Allergen Reactions   • Amoxicillin Hives   • Lisinopril Swelling   • Penicillins Hives   • Cefprozil Rash       Past Surgical History:   Procedure Laterality Date   • CHOLECYSTECTOMY N/A 3/6/2017    Procedure: LAPAROSCOPIC POSSIBLE OPEN CHOLECYSTECTOMY WITH INTRA OPERATIVE CHOLANGIOGRAM   ;  Surgeon: Oswaldo Cates MD;  Location: [facility] OR;  Service:    • COLONOSCOPY N/A 2/6/2017    Procedure: COLONOSCOPY;  Surgeon: Juan Luis Swann DO;  Location: [facility] ENDOSCOPY;  Service:    • ENDOSCOPY N/A 2/6/2017    " Procedure: ESOPHAGOGASTRODUODENOSCOPY;  Surgeon: Juan Luis Swann DO;  Location: [facility] ENDOSCOPY;  Service:        Family History   Problem Relation Age of Onset   • Diabetes Father    • Hypertension Father    • Heart disease Father         Ischemic   • Heart attack Father         MI in 40s       Social History     Social History   • Marital status:      Social History Main Topics   • Smoking status: Former Smoker     Years: 20.00     Quit date: 2011   • Smokeless tobacco: Never Used   • Alcohol use No   • Drug use: No   • Sexual activity: Defer      Comment:      Other Topics Concern   • Not on file           Objective   Physical Exam   Constitutional: He is oriented to person, place, and time. He appears well-developed and well-nourished.   HENT:   Head: Normocephalic and atraumatic.   Eyes: Conjunctivae and EOM are normal. Pupils are equal, round, and reactive to light.   Neck: Normal range of motion. Neck supple.   Cardiovascular: Normal rate, regular rhythm and normal heart sounds.    Pulmonary/Chest: Effort normal and breath sounds normal.   Abdominal: Soft. He exhibits no distension. There is no tenderness. There is no rebound and no guarding.   Musculoskeletal: Normal range of motion.   RLE ttp. 2+ pulses. Warm perfusion. No edema. No crepitus.    Neurological: He is alert and oriented to person, place, and time.   Skin: Skin is warm and dry. Capillary refill takes less than 2 seconds.   Psychiatric: He has a normal mood and affect.   Nursing note and vitals reviewed.      Procedures           ED Course        XR Ankle 3+ View Right   Final Result   No acute bony abnormality.      Electronically signed by:  Tae Vera  5/21/2018 11:59 PM   CDT Workstation: [id number]                  [facility]      Final diagnoses:   Acute idiopathic gout of right foot            Zurdo Olson MD  05/22/18 0123

## 2019-06-30 ENCOUNTER — APPOINTMENT (OUTPATIENT)
Dept: GENERAL RADIOLOGY | Facility: HOSPITAL | Age: 49
End: 2019-06-30

## 2019-06-30 ENCOUNTER — HOSPITAL ENCOUNTER (EMERGENCY)
Facility: HOSPITAL | Age: 49
Discharge: HOME | End: 2019-07-01
Attending: EMERGENCY MEDICINE | Admitting: EMERGENCY MEDICINE

## 2019-06-30 VITALS
HEIGHT: 71 IN | OXYGEN SATURATION: 96 % | SYSTOLIC BLOOD PRESSURE: 149 MMHG | DIASTOLIC BLOOD PRESSURE: 81 MMHG | RESPIRATION RATE: 18 BRPM | HEART RATE: 93 BPM | BODY MASS INDEX: 41.3 KG/M2 | TEMPERATURE: 98.1 F | WEIGHT: 295 LBS

## 2019-06-30 DIAGNOSIS — S30.861A TICK BITE OF ABDOMEN, INITIAL ENCOUNTER: ICD-10-CM

## 2019-06-30 DIAGNOSIS — L03.311 ABDOMINAL WALL CELLULITIS: Primary | ICD-10-CM
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
## 2019-06-30 LAB
ALBUMIN SERPL-MCNC: 3.9 G/DL (ref 3.5–5.2)
ALBUMIN/GLOB SERPL: 1.3 G/DL
ALP SERPL-CCNC: 57 U/L (ref 39–117)
ALT SERPL W P-5'-P-CCNC: 31 U/L (ref 1–41)
ANION GAP SERPL CALCULATED.3IONS-SCNC: 13 MMOL/L (ref 5–15)
AST SERPL-CCNC: 28 U/L (ref 1–40)
BASOPHILS # BLD AUTO: 0.04 10*3/MM3 (ref 0–0.2)
BASOPHILS NFR BLD AUTO: 0.4 % (ref 0–1.5)
BILIRUB SERPL-MCNC: 0.3 MG/DL (ref 0.2–1.2)
BILIRUB UR QL STRIP: ABNORMAL
BUN BLD-MCNC: 17 MG/DL (ref 6–20)
BUN/CREAT SERPL: 14.5 (ref 7–25)
CALCIUM SPEC-SCNC: 9.4 MG/DL (ref 8.6–10.5)
CHLORIDE SERPL-SCNC: 100 MMOL/L (ref 98–107)
CLARITY UR: CLEAR
CO2 SERPL-SCNC: 26 MMOL/L (ref 22–29)
COLOR UR: YELLOW
CREAT BLD-MCNC: 1.17 MG/DL (ref 0.76–1.27)
DEPRECATED RDW RBC AUTO: 40.8 FL (ref 37–54)
EOSINOPHIL # BLD AUTO: 0.2 10*3/MM3 (ref 0–0.4)
EOSINOPHIL NFR BLD AUTO: 2.1 % (ref 0.3–6.2)
ERYTHROCYTE [DISTWIDTH] IN BLOOD BY AUTOMATED COUNT: 13.5 % (ref 12.3–15.4)
GFR SERPL CREATININE-BSD FRML MDRD: 67 ML/MIN/1.73
GLOBULIN UR ELPH-MCNC: 3 GM/DL
GLUCOSE BLD-MCNC: 121 MG/DL (ref 65–99)
GLUCOSE UR STRIP-MCNC: NEGATIVE MG/DL
HCT VFR BLD AUTO: 34.8 % (ref 37.5–51)
HGB BLD-MCNC: 12 G/DL (ref 13–17.7)
HGB UR QL STRIP.AUTO: NEGATIVE
IMM GRANULOCYTES # BLD AUTO: 0.02 10*3/MM3 (ref 0–0.05)
IMM GRANULOCYTES NFR BLD AUTO: 0.2 % (ref 0–0.5)
KETONES UR QL STRIP: ABNORMAL
LEUKOCYTE ESTERASE UR QL STRIP.AUTO: NEGATIVE
LYMPHOCYTES # BLD AUTO: 1.4 10*3/MM3 (ref 0.7–3.1)
LYMPHOCYTES NFR BLD AUTO: 14.5 % (ref 19.6–45.3)
MCH RBC QN AUTO: 29.1 PG (ref 26.6–33)
MCHC RBC AUTO-ENTMCNC: 34.5 G/DL (ref 31.5–35.7)
MCV RBC AUTO: 84.5 FL (ref 79–97)
MONOCYTES # BLD AUTO: 0.69 10*3/MM3 (ref 0.1–0.9)
MONOCYTES NFR BLD AUTO: 7.1 % (ref 5–12)
NEUTROPHILS # BLD AUTO: 7.32 10*3/MM3 (ref 1.7–7)
NEUTROPHILS NFR BLD AUTO: 75.7 % (ref 42.7–76)
NITRITE UR QL STRIP: NEGATIVE
NRBC BLD AUTO-RTO: 0 /100 WBC (ref 0–0.2)
PH UR STRIP.AUTO: 5.5 [PH] (ref 5–9)
PLATELET # BLD AUTO: 140 10*3/MM3 (ref 140–450)
PMV BLD AUTO: 11 FL (ref 6–12)
POTASSIUM BLD-SCNC: 3.6 MMOL/L (ref 3.5–5.2)
PROT SERPL-MCNC: 6.9 G/DL (ref 6–8.5)
PROT UR QL STRIP: NEGATIVE
RBC # BLD AUTO: 4.12 10*6/MM3 (ref 4.14–5.8)
SODIUM BLD-SCNC: 139 MMOL/L (ref 136–145)
SP GR UR STRIP: 1.03 (ref 1–1.03)
UROBILINOGEN UR QL STRIP: ABNORMAL
WBC NRBC COR # BLD: 9.67 10*3/MM3 (ref 3.4–10.8)
WHOLE BLOOD HOLD SPECIMEN: NORMAL

## 2019-06-30 PROCEDURE — 81003 URINALYSIS AUTO W/O SCOPE: CPT | Performed by: EMERGENCY MEDICINE

## 2019-06-30 PROCEDURE — 85025 COMPLETE CBC W/AUTO DIFF WBC: CPT | Performed by: EMERGENCY MEDICINE

## 2019-06-30 PROCEDURE — 80053 COMPREHEN METABOLIC PANEL: CPT | Performed by: EMERGENCY MEDICINE

## 2019-06-30 PROCEDURE — 86757 RICKETTSIA ANTIBODY: CPT | Performed by: EMERGENCY MEDICINE

## 2019-06-30 PROCEDURE — 86666 EHRLICHIA ANTIBODY: CPT | Performed by: EMERGENCY MEDICINE

## 2019-06-30 PROCEDURE — 99283 EMERGENCY DEPT VISIT LOW MDM: CPT

## 2019-06-30 PROCEDURE — 71046 X-RAY EXAM CHEST 2 VIEWS: CPT

## 2019-06-30 PROCEDURE — 86753 PROTOZOA ANTIBODY NOS: CPT | Performed by: EMERGENCY MEDICINE

## 2019-06-30 PROCEDURE — 86618 LYME DISEASE ANTIBODY: CPT | Performed by: EMERGENCY MEDICINE

## 2019-06-30 RX ORDER — DOXYCYCLINE 100 MG/1
100 CAPSULE ORAL 2 TIMES DAILY
Qty: 14 CAPSULE | Refills: 0 | Status: SHIPPED | OUTPATIENT
Start: 2019-06-30 | End: 2019-07-07

## 2019-06-30 RX ORDER — SODIUM CHLORIDE 0.9 % (FLUSH) 0.9 %
10 SYRINGE (ML) INJECTION AS NEEDED
Status: DISCONTINUED | OUTPATIENT
Start: 2019-06-30 | End: 2019-07-01 | Stop reason: HOSPADM

## 2019-06-30 RX ORDER — DOXYCYCLINE 100 MG/1
100 CAPSULE ORAL ONCE
Status: COMPLETED | OUTPATIENT
Start: 2019-06-30 | End: 2019-07-01

## 2019-07-01 RX ADMIN — DOXYCYCLINE 100 MG: 100 CAPSULE ORAL at 00:05

## 2019-07-01 NOTE — ED PROVIDER NOTES
Subjective   48 year old male presents to the ED with concern for possible tick borne illness. Reports pulling a tick off his abdomen Friday and then over the last two days developed arthralgias and lightheadedness. He also reports a rash on his upper extremities. States he had RMSF when he was in his 20s and is concerned this is what is happening again. No vomiting, fever or chills. Decreased PO intake but normal urine output. His right lower abdomen has blister areas that he states are chronic but appear open and erythematous.     Family history, surgical history, social history, current medications and allergies are reviewed with the patient and triage documentation and vitals are reviewed.           History provided by:  Patient, relative and spouse   used: No        Review of Systems   Constitutional: Positive for activity change, appetite change and fatigue. Negative for chills, diaphoresis and fever.   HENT: Positive for sinus pressure. Negative for congestion, ear discharge, ear pain, postnasal drip, sinus pain, sore throat and trouble swallowing.    Eyes: Negative.    Respiratory: Positive for cough. Negative for shortness of breath and wheezing.    Cardiovascular: Negative for chest pain, palpitations and leg swelling.   Gastrointestinal: Negative for abdominal pain, constipation, diarrhea, nausea and vomiting.   Endocrine: Negative.    Genitourinary: Negative for dysuria, frequency, hematuria and urgency.   Musculoskeletal: Positive for arthralgias. Negative for back pain, myalgias and neck pain.   Skin: Positive for rash and wound. Negative for color change and pallor.   Allergic/Immunologic: Negative.    Neurological: Positive for light-headedness. Negative for speech difficulty, weakness, numbness and headaches.   Hematological: Negative.    Psychiatric/Behavioral: Negative.        Past Medical History:   Diagnosis Date   • Acute pharyngitis    • Acute sinusitis    • Allergic  "rhinitis    • Benign hypertension    • Blood glucose abnormal    • Blood in urine    • Diverticular disease    • Encounter for routine adult health examination     Adult health examination   • Essential hypertension    • Gastroesophageal reflux disease    • Hyperlipidemia    • Muscle pain    • Sleep apnea in adult     states he only uses c-pap when he \"feels really tired\"   • Upper respiratory infection    • Vitamin D deficiency    • Wheezing    • Worried well        Allergies   Allergen Reactions   • Amoxicillin Hives   • Lisinopril Swelling   • Penicillins Hives   • Cefprozil Rash       Past Surgical History:   Procedure Laterality Date   • CHOLECYSTECTOMY N/A 3/6/2017    Procedure: LAPAROSCOPIC POSSIBLE OPEN CHOLECYSTECTOMY WITH INTRA OPERATIVE CHOLANGIOGRAM   ;  Surgeon: Oswaldo Cates MD;  Location: [facility] OR;  Service:    • COLONOSCOPY N/A 2017    Procedure: COLONOSCOPY;  Surgeon: Juan Luis Swann DO;  Location: [facility] ENDOSCOPY;  Service:    • ENDOSCOPY N/A 2017    Procedure: ESOPHAGOGASTRODUODENOSCOPY;  Surgeon: Juan Luis Swann DO;  Location: [facility] ENDOSCOPY;  Service:        Family History   Problem Relation Age of Onset   • Diabetes Father    • Hypertension Father    • Heart disease Father         Ischemic   • Heart attack Father         MI in 40s       Social History     Socioeconomic History   • Marital status:      Spouse name: Not on file   • Number of children: Not on file   • Years of education: Not on file   • Highest education level: Not on file   Tobacco Use   • Smoking status: Former Smoker     Years: 20.00     Last attempt to quit:      Years since quittin.5   • Smokeless tobacco: Never Used   Substance and Sexual Activity   • Alcohol use: No   • Drug use: No   • Sexual activity: Defer     Comment:            Objective   Physical Exam   Constitutional: He is oriented to person, place, and time. He appears well-developed and well-nourished. No distress.   HENT: "   Head: Normocephalic.   Nose: Nose normal.   Mouth/Throat: Oropharynx is clear and moist.   Eyes: Conjunctivae are normal. Pupils are equal, round, and reactive to light. No scleral icterus.   Neck: Normal range of motion. Neck supple.   Cardiovascular: Normal rate, regular rhythm and intact distal pulses.   No murmur heard.  Pulmonary/Chest: Effort normal and breath sounds normal. No respiratory distress. He has no wheezes.   Abdominal: Soft. Bowel sounds are normal. He exhibits no distension and no mass. There is no tenderness. There is no rebound and no guarding.       Musculoskeletal: Normal range of motion.   Neurological: He is alert and oriented to person, place, and time.   Skin: Skin is warm and dry. Capillary refill takes less than 2 seconds. He is not diaphoretic.   Psychiatric: He has a normal mood and affect.   Nursing note and vitals reviewed.      Procedures  none         ED Course      Labs Reviewed   COMPREHENSIVE METABOLIC PANEL - Abnormal; Notable for the following components:       Result Value    Glucose 121 (*)     All other components within normal limits    Narrative:     GFR Normal >60  Chronic Kidney Disease <60  Kidney Failure <15   URINALYSIS W/ MICROSCOPIC IF INDICATED (NO CULTURE) - Abnormal; Notable for the following components:    Ketones, UA Trace (*)     Bilirubin, UA Small (1+) (*)     All other components within normal limits    Narrative:     Urine microscopic not indicated.   CBC WITH AUTO DIFFERENTIAL - Abnormal; Notable for the following components:    RBC 4.12 (*)     Hemoglobin 12.0 (*)     Hematocrit 34.8 (*)     Lymphocyte % 14.5 (*)     Neutrophils, Absolute 7.32 (*)     All other components within normal limits   SANJANA MT SPOTTED FEVER, IGG   [facility]   EHRLICHIA ANTIBODY PANEL   B. BURGDORFERI ANTIBODIES,  WB REFLEX   BABESIA MICROTI ANTIBODY PANEL   CBC AND DIFFERENTIAL    Narrative:     The following orders were created for panel order CBC &  Differential.  Procedure                               Abnormality         Status                     ---------                               -----------         ------                     Scan Slide[[id number]]                                                                  CBC Auto Differential[[id number]]        Abnormal            Final result                 Please view results for these tests on the individual orders.   EXTRA TUBES    Narrative:     The following orders were created for panel order Extra Tubes.  Procedure                               Abnormality         Status                     ---------                               -----------         ------                     Light Blue Top[[id number]]                                   Final result                 Please view results for these tests on the individual orders.   LIGHT BLUE TOP     Xr Chest 2 View    Result Date: 6/30/2019  Narrative: Exam: PA lateral chest INDICATION: Cough COMPARISON: 12/2/2017 FINDINGS: PA lateral chest. The bony structures are intact. The cardiomediastinal silhouette is unremarkable. Lungs are clear. No pneumothorax or pleural effusion.     Impression: No acute cardiopulmonary abnormality. Electronically signed by:  Yusuf Ogden MD  6/30/2019 11:17 PM CDT Workstation: [email or web address]            [facility]  Number of Diagnoses or Management Options  Abdominal wall cellulitis:   Tick bite of abdomen, initial encounter:      Amount and/or Complexity of Data Reviewed  Clinical lab tests: reviewed  Tests in the radiology section of CPT®: reviewed    Patient Progress  Patient progress: stable    Chest xray unremarkable. UA without signs of UTI. Remainder of labs unremarkable. Wound on abdomen consistent with cellulitis. Small area of rash on left upper arm near axilla. No rash or jorge luis at area of tick bite. Will start on Doxycycline for skin infection. Rickettsiella labs are drawn and sent. Advised he will be contacted if positive.  Agreeable to discharge.       Final diagnoses:   Abdominal wall cellulitis   Tick bite of abdomen, initial encounter            Stan Roa DO  07/01/19 0257

## 2019-07-02 LAB
B BURGDOR IGG+IGM SER-ACNC: <0.91 ISR (ref 0–0.9)
B BURGDOR IGM SER-ACNC: <0.8 INDEX (ref 0–0.79)

## 2019-07-03 LAB
A PHAGOCYTOPH IGM TITR SER IF: NEGATIVE {TITER}
B MICROTI IGG TITR SER: NORMAL {TITER}
B MICROTI IGM TITR SER: NORMAL {TITER}
CONV HGE IGG TITER: NEGATIVE
E CHAFFEENSIS IGG TITR SER IF: NEGATIVE {TITER}
E. CHAFFEENSIS (HME) IGM TITER: NEGATIVE
R RICKETTSI IGG SER QL IA: NORMAL
R RICKETTSI IGG SER QL IA: POSITIVE
R RICKETTSI IGM TITR SER: 0.46 INDEX (ref 0–0.89)

## 2021-08-20 ENCOUNTER — APPOINTMENT (OUTPATIENT)
Dept: GENERAL RADIOLOGY | Facility: HOSPITAL | Age: 51
End: 2021-08-20

## 2021-08-20 ENCOUNTER — HOSPITAL ENCOUNTER (EMERGENCY)
Facility: HOSPITAL | Age: 51
Discharge: HOME | End: 2021-08-20
Attending: STUDENT IN AN ORGANIZED HEALTH CARE EDUCATION/TRAINING PROGRAM | Admitting: STUDENT IN AN ORGANIZED HEALTH CARE EDUCATION/TRAINING PROGRAM

## 2021-08-20 VITALS
HEIGHT: 71 IN | TEMPERATURE: 98.9 F | HEART RATE: 75 BPM | RESPIRATION RATE: 18 BRPM | WEIGHT: 310 LBS | OXYGEN SATURATION: 95 % | BODY MASS INDEX: 43.4 KG/M2 | SYSTOLIC BLOOD PRESSURE: 149 MMHG | DIASTOLIC BLOOD PRESSURE: 89 MMHG

## 2021-08-20 DIAGNOSIS — S99.191A CLOSED FRACTURE OF BASE OF FIFTH METATARSAL BONE OF RIGHT FOOT AT METAPHYSEAL-DIAPHYSEAL JUNCTION, INITIAL ENCOUNTER: Primary | ICD-10-CM

## 2021-08-20 PROCEDURE — 99282 EMERGENCY DEPT VISIT SF MDM: CPT

## 2021-08-20 PROCEDURE — 73630 X-RAY EXAM OF FOOT: CPT

## 2021-08-20 PROCEDURE — 73610 X-RAY EXAM OF ANKLE: CPT

## 2021-08-20 RX ORDER — HYDROCODONE BITARTRATE AND ACETAMINOPHEN 7.5; 325 MG/1; MG/1
1 TABLET ORAL EVERY 6 HOURS PRN
Qty: 12 TABLET | Refills: 0 | Status: SHIPPED | OUTPATIENT
Start: 2021-08-20

## 2021-08-20 NOTE — ED PROVIDER NOTES
"Subjective   50-year-old male had a mechanical fall earlier today and hurt his right foot.  Patient thinks it is broken.  No weakness or numbness.  He is able to bear weight on it, but it is painful.  He takes tramadol at home.  He did not hit his head or pass out.  Not complaining of pain anywhere else.      History provided by:  Patient   used: No        Review of Systems   Constitutional: Positive for activity change.   Respiratory: Negative for cough and shortness of breath.    Cardiovascular: Negative for chest pain.   Gastrointestinal: Negative for abdominal pain and nausea.   Genitourinary: Negative for dysuria and flank pain.   Musculoskeletal: Positive for gait problem.        Foot pain   Skin: Negative for color change and rash.   Neurological: Negative for light-headedness and headaches.       Past Medical History:   Diagnosis Date   • Acute pharyngitis    • Acute sinusitis    • Allergic rhinitis    • Benign hypertension    • Blood glucose abnormal    • Blood in urine    • Diverticular disease    • Encounter for routine adult health examination     Adult health examination   • Essential hypertension    • Gastroesophageal reflux disease    • Hyperlipidemia    • Muscle pain    • Sleep apnea in adult     states he only uses c-pap when he \"feels really tired\"   • Upper respiratory infection    • Vitamin D deficiency    • Wheezing    • Worried well        Allergies   Allergen Reactions   • Amoxicillin Hives   • Lisinopril Swelling   • Penicillins Hives   • Cefprozil Rash       Past Surgical History:   Procedure Laterality Date   • CHOLECYSTECTOMY N/A 3/6/2017    Procedure: LAPAROSCOPIC POSSIBLE OPEN CHOLECYSTECTOMY WITH INTRA OPERATIVE CHOLANGIOGRAM   ;  Surgeon: Oswaldo Cates MD;  Location: [facility] OR;  Service:    • COLONOSCOPY N/A 2/6/2017    Procedure: COLONOSCOPY;  Surgeon: Juan Luis Swann DO;  Location: [facility] ENDOSCOPY;  Service:    • ENDOSCOPY N/A 2/6/2017    Procedure: " "ESOPHAGOGASTRODUODENOSCOPY;  Surgeon: Juan Luis Swann DO;  Location: [facility] ENDOSCOPY;  Service:        Family History   Problem Relation Age of Onset   • Diabetes Father    • Hypertension Father    • Heart disease Father         Ischemic   • Heart attack Father         MI in 40s       Social History     Socioeconomic History   • Marital status:      Spouse name: Not on file   • Number of children: Not on file   • Years of education: Not on file   • Highest education level: Not on file   Tobacco Use   • Smoking status: Former Smoker     Years: 20.00     Quit date: 2011     Years since quitting: 10.6   • Smokeless tobacco: Never Used   Substance and Sexual Activity   • Alcohol use: No   • Drug use: No   • Sexual activity: Defer     Comment:            Objective    Vitals:    08/20/21 1440   BP: 149/89   BP Location: Right arm   Patient Position: Sitting   Pulse: 75   Resp: 18   Temp: 98.9 °F (37.2 °C)   TempSrc: Oral   SpO2: 95%   Weight: (!) 141 kg (310 lb)   Height: 180.3 cm (71\")       Physical Exam  Vitals and nursing note reviewed.   Constitutional:       General: He is not in acute distress.     Appearance: He is well-developed. He is obese. He is not ill-appearing or diaphoretic.   HENT:      Head: Normocephalic.      Right Ear: External ear normal.      Left Ear: External ear normal.      Nose: No rhinorrhea.   Eyes:      Conjunctiva/sclera: Conjunctivae normal.   Neck:      Trachea: Trachea normal.   Pulmonary:      Effort: Pulmonary effort is normal. No accessory muscle usage or respiratory distress.   Musculoskeletal:      Right foot: Normal capillary refill. Swelling, tenderness and bony tenderness present. No deformity.   Skin:     General: Skin is warm and dry.      Capillary Refill: Capillary refill takes less than 2 seconds.   Neurological:      Mental Status: He is alert and oriented to person, place, and time.      Sensory: No sensory deficit.      Motor: No weakness.   Psychiatric:  "        Behavior: Behavior normal.         Procedures           ED Course  ED Course as of Aug 20 1926   Fri Aug 20, 2021   1655 KERA PDMP reviewed. Pt is on tramadol.    [BH]      ED Course User Index  [BH] Venu Garay MD      XR Ankle 3+ View Right   Final Result   CONCLUSION:   Soft tissue swelling about the ankle.   No fracture or dislocation.   1.1 cm calcaneal spur at insertion of Achilles tendon.      [id number]      Electronically signed by:  Lalito Luna MD  8/20/2021 4:09 PM CDT   Workstation: [employer]      XR Foot 3+ View Right   Final Result   Incomplete fracture at the base of the right fifth metatarsal.      First metatarsophalangeal joint osteoarthritis and small bunion.          Electronically signed by:  Vitaliy Omer MD  8/20/2021   3:43 PM CDT Workstation: [id number]              MDM  Number of Diagnoses or Management Options  Closed fracture of base of fifth metatarsal bone of right foot at metaphyseal-diaphyseal junction, initial encounter: new and requires workup  Diagnosis management comments: Vital signs are stable, afebrile.  Neurovascularly intact.  X-rays show incomplete fracture base of the right fifth metatarsal.  Foot was splinted. patient already has crutches to use.  Called in patient is already on tramadol, but I called in extra pain medicine to help with his acute fracture.  Results of the emergency department evaluation shared. Recommend primary care and Podiatry follow-up. Return precautions provided.      Final diagnoses:   Closed fracture of base of fifth metatarsal bone of right foot at metaphyseal-diaphyseal junction, initial encounter       ED Disposition  ED Disposition     ED Disposition Condition Comment    Discharge Stable           Lisa Reese, APRN  [address] [facility] [address]  [phone]    Schedule an appointment as soon as possible for a visit in 2 days  ER follow up    [facility]  [address]  Medical  [address] [address] [phone]  Schedule an appointment as soon as possible for a visit in 1 day  ER follow up         Medication List      Changed    * HYDROcodone-acetaminophen 5-325 MG per tablet  Commonly known as: NORCO  Take 2 tablets by mouth Every 6 (Six) Hours As Needed for Moderate Pain .  What changed: Another medication with the same name was added. Make sure you understand how and when to take each.     * HYDROcodone-acetaminophen 5-325 MG per tablet  Commonly known as: NORCO  Take 1 tablet by mouth Every 6 (Six) Hours As Needed for Moderate Pain  or Severe Pain .  What changed: Another medication with the same name was added. Make sure you understand how and when to take each.     * HYDROcodone-acetaminophen 7.5-325 MG per tablet  Commonly known as: NORCO  Take 1 tablet by mouth Every 6 (Six) Hours As Needed for Moderate Pain .  What changed: You were already taking a medication with the same name, and this prescription was added. Make sure you understand how and when to take each.         * This list has 3 medication(s) that are the same as other medications prescribed for you. Read the directions carefully, and ask your doctor or other care provider to review them with you.               Where to Get Your Medications      These medications were sent to [facility] [address] [place] [address] [employer] [facility] - [phone] [facility] [phone] [facility] [employer] [facility] [address]    Phone: [phone]   · HYDROcodone-acetaminophen 7.5-325 MG per tablet          Venu Garay MD  08/20/21 3384

## 2021-08-26 ENCOUNTER — OFFICE VISIT (OUTPATIENT)
Dept: PODIATRY | Facility: CLINIC | Age: 51
End: 2021-08-26

## 2021-08-26 VITALS
BODY MASS INDEX: 43.4 KG/M2 | HEART RATE: 81 BPM | OXYGEN SATURATION: 96 % | HEIGHT: 71 IN | WEIGHT: 310 LBS | DIASTOLIC BLOOD PRESSURE: 88 MMHG | SYSTOLIC BLOOD PRESSURE: 150 MMHG

## 2021-08-26 DIAGNOSIS — S92.354A NONDISPLACED FRACTURE OF FIFTH METATARSAL BONE, RIGHT FOOT, INITIAL ENCOUNTER FOR CLOSED FRACTURE: Primary | ICD-10-CM

## 2021-08-26 DIAGNOSIS — M79.671 RIGHT FOOT PAIN: ICD-10-CM

## 2021-08-26 PROCEDURE — 99203 OFFICE O/P NEW LOW 30 MIN: CPT | Performed by: PODIATRIST

## 2021-08-26 RX ORDER — CHLORAL HYDRATE 500 MG
1 CAPSULE ORAL DAILY
COMMUNITY

## 2021-08-26 RX ORDER — ALLOPURINOL 100 MG/1
100 TABLET ORAL 2 TIMES DAILY
COMMUNITY
Start: 2021-08-16

## 2021-08-26 RX ORDER — HYDROCHLOROTHIAZIDE 12.5 MG/1
CAPSULE, GELATIN COATED ORAL
COMMUNITY
Start: 2021-06-25

## 2021-08-26 RX ORDER — BUPROPION HYDROCHLORIDE 150 MG/1
300 TABLET ORAL DAILY
COMMUNITY
Start: 2021-07-19 | End: 2021-10-28

## 2021-08-26 RX ORDER — TRAMADOL HYDROCHLORIDE 50 MG/1
TABLET ORAL
COMMUNITY
Start: 2021-07-22 | End: 2022-12-30

## 2021-08-26 RX ORDER — PRAVASTATIN SODIUM 40 MG
40 TABLET ORAL NIGHTLY
COMMUNITY
Start: 2021-08-17

## 2021-08-26 RX ORDER — NAPROXEN 500 MG/1
500 TABLET ORAL 2 TIMES DAILY WITH MEALS
COMMUNITY
Start: 2021-08-16

## 2021-08-26 NOTE — PROGRESS NOTES
"Dk Goldstein  1970  50 y.o. male    Patient presents to clinic today with the complaint of right foot pain.   08/26/2021  Chief Complaint   Patient presents with   • Right Foot - Pain           History of Present Illness    Dk Goldstein is a 50 y.o. male who presents for evaluation of right foot injury.  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  As he tried to assist him grabbing the dog he felt a crunching sensation to the outside of his foot.  He had immediate pain and decreased ability to bear weight.  Denies any other trauma or injuries.    Past Medical History:   Diagnosis Date   • Acute pharyngitis    • Acute sinusitis    • Allergic rhinitis    • Benign hypertension    • Blood glucose abnormal    • Blood in urine    • Diverticular disease    • Encounter for routine adult health examination     Adult health examination   • Essential hypertension    • Gastroesophageal reflux disease    • Gout    • Hyperlipidemia    • Muscle pain    • Sleep apnea in adult     states he only uses c-pap when he \"feels really tired\"   • Upper respiratory infection    • Verruca    • Vitamin D deficiency    • Wheezing    • Worried well          Past Surgical History:   Procedure Laterality Date   • CHOLECYSTECTOMY N/A 3/6/2017    Procedure: LAPAROSCOPIC POSSIBLE OPEN CHOLECYSTECTOMY WITH INTRA OPERATIVE CHOLANGIOGRAM   ;  Surgeon: Oswaldo Cates MD;  Location: [facility] OR;  Service:    • COLONOSCOPY N/A 2/6/2017    Procedure: COLONOSCOPY;  Surgeon: Juan Luis Swann DO;  Location: [facility] ENDOSCOPY;  Service:    • ENDOSCOPY N/A 2/6/2017    Procedure: ESOPHAGOGASTRODUODENOSCOPY;  Surgeon: Juan Luis Swann DO;  Location: [facility] ENDOSCOPY;  Service:          Family History   Problem Relation Age of Onset   • Diabetes Father    • Hypertension Father    • Heart disease Father         Ischemic   • Heart attack Father         MI in 40s   • Osteoporosis Mother    • Thyroid disease Mother    • " Cancer Maternal Grandfather          Social History     Socioeconomic History   • Marital status:      Spouse name: Not on file   • Number of children: Not on file   • Years of education: Not on file   • Highest education level: Not on file   Tobacco Use   • Smoking status: Former Smoker     Years: 20.00     Quit date: 2011     Years since quitting: 10.6   • Smokeless tobacco: Never Used   Vaping Use   • Vaping Use: Never used   Substance and Sexual Activity   • Alcohol use: No   • Drug use: No   • Sexual activity: Defer     Comment:          Current Outpatient Medications   Medication Sig Dispense Refill   • allopurinol (ZYLOPRIM) 100 MG tablet Take 100 mg by mouth 2 (Two) Times a Day.     • atenolol (TENORMIN) 25 MG tablet Take 1 tablet by mouth 2 (Two) Times a Day. 60 tablet 2   • buPROPion XL (WELLBUTRIN XL) 150 MG 24 hr tablet Take 300 mg by mouth Daily.     • CloNIDine (CATAPRES) 0.1 MG tablet Take 1 tablet by mouth 2 (Two) Times a Day As Needed for High Blood Pressure (blood pressure over 180/100). 30 tablet 0   • cyclobenzaprine (FLEXERIL) 10 MG tablet Take 1 tablet by mouth 3 (Three) Times a Day As Needed for Muscle Spasms. 21 tablet 0   • furosemide (LASIX) 40 MG tablet Take 40 mg by mouth 2 (Two) Times a Day. Patient states he takes daily as needed     • hydroCHLOROthiazide (MICROZIDE) 12.5 MG capsule      • HYDROcodone-acetaminophen (NORCO) 7.5-325 MG per tablet Take 1 tablet by mouth Every 6 (Six) Hours As Needed for Moderate Pain . 12 tablet 0   • naproxen (NAPROSYN) 500 MG tablet Take 500 mg by mouth 2 (Two) Times a Day With Meals.     • Omega-3 Fatty Acids (fish oil) 1000 MG capsule capsule Take 1 capsule by mouth Daily.     • pantoprazole (PROTONIX) 40 MG EC tablet Take 1 tablet by mouth Every Night. 30 tablet 0   • pravastatin (PRAVACHOL) 40 MG tablet Take 40 mg by mouth Every Night.     • traMADol (ULTRAM) 50 MG tablet      • vitamin D (ERGOCALCIFEROL) 35889 UNITS capsule capsule  "Take 50,000 Units by mouth 1 (One) Time Per Week.     • hydrOXYzine (ATARAX) 25 MG tablet Take 25 mg by mouth.     • meclizine (ANTIVERT) 25 MG tablet Take 1 tablet by mouth Every 6 (Six) Hours As Needed for dizziness. 20 tablet 0     No current facility-administered medications for this visit.         OBJECTIVE    /88   Pulse 81   Ht 180.3 cm (71\")   Wt (!) 141 kg (310 lb)   SpO2 96%   BMI 43.24 kg/m²       Review of Systems   Constitutional: Negative.    HENT: Negative.    Eyes: Negative.    Respiratory: Negative.    Cardiovascular: Positive for leg swelling.   Gastrointestinal: Negative.    Endocrine: Negative.    Genitourinary: Positive for enuresis.   Musculoskeletal: Positive for back pain.        Foot pain  Joint pain   Skin: Negative.    Allergic/Immunologic: Negative.    Neurological: Positive for dizziness.   Hematological: Negative.    Psychiatric/Behavioral: The patient is nervous/anxious.          Physical Exam   Constitutional: he appears well-developed and well-nourished.   HEENT: Normocephalic. Atraumatic  CV: No CP. RRR  Resp: Non-labored respirations  Psychiatric: he  has a normal mood and affect. he  behavior is normal.         Lower Extremity Exam:  Vascular: DP/PT pulses palpable 2+.   Mild right midfoot edema  Foot warm  CFT wnl  Neuro: Protective sensation intact, b/l.  DTRs intact  Integument: No open wounds or lesions.  Mild ecchymosis  No erythema  Skin quality normal  Musculoskeletal: LE muscle strength 5/5.   Gait assisted with knee scooter  Ankle ROM full without pain or crepitus  STJ ROM full without pain or crepitus  Can flex/extend all toes  +Tenderness to palpation fifth metatarsal base              ASSESSMENT AND PLAN    Diagnoses and all orders for this visit:    1. Nondisplaced fracture of fifth metatarsal bone, right foot, initial encounter for closed fracture (Primary)    2. Right foot pain      -Comprehensive foot and ankle exam  -Radiographs reviewed.  Discussed " findings of nondisplaced fifth metatarsal fracture.  -Plan to treat conservatively.  Placed in a low tide Cam boot today.  May continue to use knee scooter as needed, but may advance weightbearing in cam boot as tolerated  -Recheck 3 weeks for serial radiographs        This document has been electronically signed by Juan Luis Ogden DPM on August 27, 2021 07:42 CDT       Much of this encounter note is an electronic transcription/translation of spoken language to printed text.   Juan Luis Ogden DPM  8/27/2021  07:42 CDT

## 2021-09-16 ENCOUNTER — OFFICE VISIT (OUTPATIENT)
Dept: PODIATRY | Facility: CLINIC | Age: 51
End: 2021-09-16

## 2021-09-16 VITALS
HEIGHT: 71 IN | BODY MASS INDEX: 43.4 KG/M2 | SYSTOLIC BLOOD PRESSURE: 142 MMHG | DIASTOLIC BLOOD PRESSURE: 85 MMHG | WEIGHT: 310 LBS

## 2021-09-16 DIAGNOSIS — S92.354D CLOSED NONDISPLACED FRACTURE OF FIFTH METATARSAL BONE OF RIGHT FOOT WITH ROUTINE HEALING, SUBSEQUENT ENCOUNTER: Primary | ICD-10-CM

## 2021-09-16 DIAGNOSIS — M79.671 RIGHT FOOT PAIN: ICD-10-CM

## 2021-09-16 PROCEDURE — 99213 OFFICE O/P EST LOW 20 MIN: CPT | Performed by: PODIATRIST

## 2021-09-16 NOTE — PROGRESS NOTES
"Dk Goldstein  1970  51 y.o. male    Patient presents to clinic today for follow up of right foot pain.     09/16/2021    Chief Complaint   Patient presents with   • Right Foot - Follow-up           History of Present Illness    Dk Goldstein is a 51 y.o. male who presents for follow-up of right fifth metatarsal fracture.  He has been partial ambulating in the cam boot and also continues to use his knee scooter.  Feels his pain and swelling are improving, but still present especially on the outside of the boot.    Past Medical History:   Diagnosis Date   • Acute pharyngitis    • Acute sinusitis    • Allergic rhinitis    • Benign hypertension    • Blood glucose abnormal    • Blood in urine    • Diverticular disease    • Encounter for routine adult health examination     Adult health examination   • Essential hypertension    • Gastroesophageal reflux disease    • Gout    • Hyperlipidemia    • Muscle pain    • Sleep apnea in adult     states he only uses c-pap when he \"feels really tired\"   • Upper respiratory infection    • Verruca    • Vitamin D deficiency    • Wheezing    • Worried well          Past Surgical History:   Procedure Laterality Date   • CHOLECYSTECTOMY N/A 3/6/2017    Procedure: LAPAROSCOPIC POSSIBLE OPEN CHOLECYSTECTOMY WITH INTRA OPERATIVE CHOLANGIOGRAM   ;  Surgeon: Oswaldo Cates MD;  Location: [facility] OR;  Service:    • COLONOSCOPY N/A 2/6/2017    Procedure: COLONOSCOPY;  Surgeon: Juan Luis Swann DO;  Location: [facility] ENDOSCOPY;  Service:    • ENDOSCOPY N/A 2/6/2017    Procedure: ESOPHAGOGASTRODUODENOSCOPY;  Surgeon: Juan Luis Swann DO;  Location: [facility] ENDOSCOPY;  Service:          Family History   Problem Relation Age of Onset   • Diabetes Father    • Hypertension Father    • Heart disease Father         Ischemic   • Heart attack Father         MI in 40s   • Osteoporosis Mother    • Thyroid disease Mother    • Cancer Maternal Grandfather          Social History     Socioeconomic " History   • Marital status:      Spouse name: Not on file   • Number of children: Not on file   • Years of education: Not on file   • Highest education level: Not on file   Tobacco Use   • Smoking status: Former Smoker     Years: 20.00     Quit date: 2011     Years since quitting: 10.7   • Smokeless tobacco: Never Used   Vaping Use   • Vaping Use: Never used   Substance and Sexual Activity   • Alcohol use: No   • Drug use: No   • Sexual activity: Defer     Comment:          Current Outpatient Medications   Medication Sig Dispense Refill   • allopurinol (ZYLOPRIM) 100 MG tablet Take 100 mg by mouth 2 (Two) Times a Day.     • atenolol (TENORMIN) 25 MG tablet Take 1 tablet by mouth 2 (Two) Times a Day. 60 tablet 2   • buPROPion XL (WELLBUTRIN XL) 150 MG 24 hr tablet Take 300 mg by mouth Daily.     • CloNIDine (CATAPRES) 0.1 MG tablet Take 1 tablet by mouth 2 (Two) Times a Day As Needed for High Blood Pressure (blood pressure over 180/100). 30 tablet 0   • cyclobenzaprine (FLEXERIL) 10 MG tablet Take 1 tablet by mouth 3 (Three) Times a Day As Needed for Muscle Spasms. 21 tablet 0   • furosemide (LASIX) 40 MG tablet Take 40 mg by mouth 2 (Two) Times a Day. Patient states he takes daily as needed     • hydroCHLOROthiazide (MICROZIDE) 12.5 MG capsule      • HYDROcodone-acetaminophen (NORCO) 7.5-325 MG per tablet Take 1 tablet by mouth Every 6 (Six) Hours As Needed for Moderate Pain . 12 tablet 0   • hydrOXYzine (ATARAX) 25 MG tablet Take 25 mg by mouth.     • meclizine (ANTIVERT) 25 MG tablet Take 1 tablet by mouth Every 6 (Six) Hours As Needed for dizziness. 20 tablet 0   • naproxen (NAPROSYN) 500 MG tablet Take 500 mg by mouth 2 (Two) Times a Day With Meals.     • Omega-3 Fatty Acids (fish oil) 1000 MG capsule capsule Take 1 capsule by mouth Daily.     • pantoprazole (PROTONIX) 40 MG EC tablet Take 1 tablet by mouth Every Night. 30 tablet 0   • pravastatin (PRAVACHOL) 40 MG tablet Take 40 mg by mouth Every  "Night.     • traMADol (ULTRAM) 50 MG tablet      • vitamin D (ERGOCALCIFEROL) 35975 UNITS capsule capsule Take 50,000 Units by mouth 1 (One) Time Per Week.       No current facility-administered medications for this visit.         OBJECTIVE    /85   Ht 180.3 cm (71\")   Wt (!) 141 kg (310 lb)   BMI 43.24 kg/m²       Review of Systems   Constitutional: Negative.    HENT: Negative.    Eyes: Negative.    Respiratory: Negative.    Cardiovascular: Positive for leg swelling.   Gastrointestinal: Negative.    Endocrine: Negative.    Genitourinary: Positive for enuresis.   Musculoskeletal: Positive for back pain.        Foot pain  Joint pain   Skin: Negative.    Allergic/Immunologic: Negative.    Neurological: Positive for dizziness.   Hematological: Negative.    Psychiatric/Behavioral: The patient is nervous/anxious.          Physical Exam   Constitutional: he appears well-developed and well-nourished.   HEENT: Normocephalic. Atraumatic  CV: No CP. RRR  Resp: Non-labored respirations  Psychiatric: he  has a normal mood and affect. he  behavior is normal.         Lower Extremity Exam:  Vascular: DP/PT pulses palpable 2+.   Mild right midfoot edema  Foot warm  CFT wnl  Neuro: Protective sensation intact, b/l.  DTRs intact  Integument: No open wounds or lesions.  No ecchymosis  No erythema  Skin quality normal  Musculoskeletal: LE muscle strength 5/5.   Gait assisted with knee scooter  Ankle ROM full without pain or crepitus  STJ ROM full without pain or crepitus  Can flex/extend all toes  +Tenderness to palpation fifth metatarsal base, improving              ASSESSMENT AND PLAN    Diagnoses and all orders for this visit:    1. Closed nondisplaced fracture of fifth metatarsal bone of right foot with routine healing, subsequent encounter (Primary)  -     XR Foot 3+ View Right    2. Right foot pain      -Comprehensive foot and ankle exam  -Radiographs ordered and reviewed.  Early resorption of the fracture site and soft " bony callus formation noted.  -Continue cam boot.  Increase weightbearing as tolerated.  We will continue to hold off work at this time.  -Recheck 3 weeks for serial radiographs        This document has been electronically signed by Juan Luis Ogden DPM on September 17, 2021 07:07 CDT       Much of this encounter note is an electronic transcription/translation of spoken language to printed text.   Juan Luis Ogden DPM  9/17/2021  07:07 CDT

## 2021-10-07 ENCOUNTER — OFFICE VISIT (OUTPATIENT)
Dept: PODIATRY | Facility: CLINIC | Age: 51
End: 2021-10-07

## 2021-10-07 VITALS
WEIGHT: 310 LBS | OXYGEN SATURATION: 95 % | DIASTOLIC BLOOD PRESSURE: 85 MMHG | HEIGHT: 71 IN | HEART RATE: 65 BPM | BODY MASS INDEX: 43.4 KG/M2 | SYSTOLIC BLOOD PRESSURE: 142 MMHG

## 2021-10-07 DIAGNOSIS — M79.671 RIGHT FOOT PAIN: ICD-10-CM

## 2021-10-07 DIAGNOSIS — S92.354D CLOSED NONDISPLACED FRACTURE OF FIFTH METATARSAL BONE OF RIGHT FOOT WITH ROUTINE HEALING, SUBSEQUENT ENCOUNTER: Primary | ICD-10-CM

## 2021-10-07 PROCEDURE — 99213 OFFICE O/P EST LOW 20 MIN: CPT | Performed by: PODIATRIST

## 2021-10-07 RX ORDER — ATENOLOL 50 MG/1
50 TABLET ORAL
COMMUNITY
Start: 2021-08-06 | End: 2021-10-07

## 2021-10-07 RX ORDER — ALLOPURINOL 100 MG/1
TABLET ORAL
COMMUNITY
Start: 2021-08-06 | End: 2021-10-07

## 2021-10-07 RX ORDER — HYDROCHLOROTHIAZIDE 12.5 MG/1
12.5 TABLET ORAL DAILY
COMMUNITY
Start: 2021-08-06 | End: 2021-10-07

## 2021-10-07 RX ORDER — CIPROFLOXACIN 500 MG/1
500 TABLET, FILM COATED ORAL 2 TIMES DAILY
COMMUNITY
Start: 2021-08-09 | End: 2021-11-29

## 2021-10-07 RX ORDER — BUPROPION HYDROCHLORIDE 300 MG/1
300 TABLET ORAL DAILY
COMMUNITY
Start: 2021-08-06 | End: 2021-10-07

## 2021-10-07 RX ORDER — FUROSEMIDE 20 MG/1
TABLET ORAL
COMMUNITY
Start: 2021-08-09 | End: 2021-10-07

## 2021-10-07 RX ORDER — CYCLOBENZAPRINE HCL 10 MG
TABLET ORAL
COMMUNITY
Start: 2021-07-06 | End: 2021-10-07

## 2021-10-07 NOTE — PROGRESS NOTES
"Dk Goldstein  1970  51 y.o. male    Patient presents to clinic today for follow up of right foot pain. Xray obtained today     10/07/2021      Chief Complaint   Patient presents with   • Right Foot - Follow-up           History of Present Illness    Dk Goldstein is a 51 y.o. male who presents for follow-up of right fifth metatarsal fracture.  He has been partial ambulating in the cam boot .  Feels his pain and swelling are improving.  Patient is set to start a new job in a couple of weeks.    Past Medical History:   Diagnosis Date   • Acute pharyngitis    • Acute sinusitis    • Allergic rhinitis    • Benign hypertension    • Blood glucose abnormal    • Blood in urine    • Diverticular disease    • Encounter for routine adult health examination     Adult health examination   • Essential hypertension    • Gastroesophageal reflux disease    • Gout    • Hyperlipidemia    • Muscle pain    • Sleep apnea in adult     states he only uses c-pap when he \"feels really tired\"   • Upper respiratory infection    • Verruca    • Vitamin D deficiency    • Wheezing    • Worried well          Past Surgical History:   Procedure Laterality Date   • CHOLECYSTECTOMY N/A 3/6/2017    Procedure: LAPAROSCOPIC POSSIBLE OPEN CHOLECYSTECTOMY WITH INTRA OPERATIVE CHOLANGIOGRAM   ;  Surgeon: Oswaldo Cates MD;  Location: [facility] OR;  Service:    • COLONOSCOPY N/A 2/6/2017    Procedure: COLONOSCOPY;  Surgeon: Juan Luis Swann DO;  Location: [facility] ENDOSCOPY;  Service:    • ENDOSCOPY N/A 2/6/2017    Procedure: ESOPHAGOGASTRODUODENOSCOPY;  Surgeon: Juan Luis Swann DO;  Location: [facility] ENDOSCOPY;  Service:          Family History   Problem Relation Age of Onset   • Diabetes Father    • Hypertension Father    • Heart disease Father         Ischemic   • Heart attack Father         MI in 40s   • Osteoporosis Mother    • Thyroid disease Mother    • Cancer Maternal Grandfather          Social History     Socioeconomic History   • Marital " status:      Spouse name: Not on file   • Number of children: Not on file   • Years of education: Not on file   • Highest education level: Not on file   Tobacco Use   • Smoking status: Former Smoker     Years: 20.00     Quit date: 2011     Years since quitting: 10.7   • Smokeless tobacco: Never Used   Vaping Use   • Vaping Use: Never used   Substance and Sexual Activity   • Alcohol use: No   • Drug use: No   • Sexual activity: Defer     Comment:          Current Outpatient Medications   Medication Sig Dispense Refill   • allopurinol (ZYLOPRIM) 100 MG tablet Take 100 mg by mouth 2 (Two) Times a Day.     • atenolol (TENORMIN) 25 MG tablet Take 1 tablet by mouth 2 (Two) Times a Day. 60 tablet 2   • buPROPion XL (WELLBUTRIN XL) 150 MG 24 hr tablet Take 300 mg by mouth Daily.     • ciprofloxacin (CIPRO) 500 MG tablet Take 500 mg by mouth 2 (Two) Times a Day. for 7 days     • CloNIDine (CATAPRES) 0.1 MG tablet Take 1 tablet by mouth 2 (Two) Times a Day As Needed for High Blood Pressure (blood pressure over 180/100). 30 tablet 0   • cyclobenzaprine (FLEXERIL) 10 MG tablet Take 1 tablet by mouth 3 (Three) Times a Day As Needed for Muscle Spasms. 21 tablet 0   • furosemide (LASIX) 40 MG tablet Take 40 mg by mouth 2 (Two) Times a Day. Patient states he takes daily as needed     • hydroCHLOROthiazide (MICROZIDE) 12.5 MG capsule      • HYDROcodone-acetaminophen (NORCO) 7.5-325 MG per tablet Take 1 tablet by mouth Every 6 (Six) Hours As Needed for Moderate Pain . 12 tablet 0   • hydrOXYzine (ATARAX) 25 MG tablet Take 25 mg by mouth.     • meclizine (ANTIVERT) 25 MG tablet Take 1 tablet by mouth Every 6 (Six) Hours As Needed for dizziness. 20 tablet 0   • naproxen (NAPROSYN) 500 MG tablet Take 500 mg by mouth 2 (Two) Times a Day With Meals.     • Omega-3 Fatty Acids (fish oil) 1000 MG capsule capsule Take 1 capsule by mouth Daily.     • pantoprazole (PROTONIX) 40 MG EC tablet Take 1 tablet by mouth Every Night. 30  "tablet 0   • pravastatin (PRAVACHOL) 40 MG tablet Take 40 mg by mouth Every Night.     • traMADol (ULTRAM) 50 MG tablet      • vitamin D (ERGOCALCIFEROL) 61027 UNITS capsule capsule Take 50,000 Units by mouth 1 (One) Time Per Week.       No current facility-administered medications for this visit.         OBJECTIVE    /85   Pulse 65   Ht 180.3 cm (71\")   Wt (!) 141 kg (310 lb)   SpO2 95%   BMI 43.24 kg/m²       Review of Systems   Constitutional: Negative.    HENT: Negative.    Eyes: Negative.    Respiratory: Negative.    Cardiovascular: Positive for leg swelling.   Gastrointestinal: Negative.    Endocrine: Negative.    Genitourinary: Positive for enuresis.   Musculoskeletal: Positive for back pain.        Foot pain  Joint pain   Skin: Negative.    Allergic/Immunologic: Negative.    Neurological: Positive for dizziness.   Hematological: Negative.    Psychiatric/Behavioral: The patient is nervous/anxious.          Physical Exam   Constitutional: he appears well-developed and well-nourished.   HEENT: Normocephalic. Atraumatic  CV: No CP. RRR  Resp: Non-labored respirations  Psychiatric: he  has a normal mood and affect. he  behavior is normal.         Lower Extremity Exam:  Vascular: DP/PT pulses palpable 2+.   Mild right midfoot edema  Foot warm  CFT wnl  Neuro: Protective sensation intact, b/l.  DTRs intact  Integument: No open wounds or lesions.  No ecchymosis  No erythema  Skin quality normal  Musculoskeletal: LE muscle strength 5/5.   Gait assisted with knee scooter  Ankle ROM full without pain or crepitus  STJ ROM full without pain or crepitus  Can flex/extend all toes  Minimal tenderness to palpation fifth metatarsal base, improving              ASSESSMENT AND PLAN    Diagnoses and all orders for this visit:    1. Closed nondisplaced fracture of fifth metatarsal bone of right foot with routine healing, subsequent encounter (Primary)  -     XR Foot 3+ View Right      -Comprehensive foot and ankle " exam  -Radiographs ordered and reviewed.  Continued, partial healing of fracture site noted.  -Continue cam boot.  Increase weightbearing as tolerated.  Advised continued protection with cam boot over the next 2 weeks.  May begin new job with restrictions to lifting and climbing.  -Recheck 3 weeks for serial radiographs        This document has been electronically signed by Katheirn Hurd MA on October 7, 2021 14:03 CDT       Much of this encounter note is an electronic transcription/translation of spoken language to printed text.   Katherin Hurd MA  10/7/2021  14:03 CDT

## 2021-10-28 ENCOUNTER — OFFICE VISIT (OUTPATIENT)
Dept: PODIATRY | Facility: CLINIC | Age: 51
End: 2021-10-28

## 2021-10-28 VITALS
DIASTOLIC BLOOD PRESSURE: 76 MMHG | SYSTOLIC BLOOD PRESSURE: 122 MMHG | WEIGHT: 310 LBS | OXYGEN SATURATION: 98 % | HEART RATE: 106 BPM | HEIGHT: 71 IN | BODY MASS INDEX: 43.4 KG/M2

## 2021-10-28 DIAGNOSIS — S92.354D CLOSED NONDISPLACED FRACTURE OF FIFTH METATARSAL BONE OF RIGHT FOOT WITH ROUTINE HEALING, SUBSEQUENT ENCOUNTER: Primary | ICD-10-CM

## 2021-10-28 DIAGNOSIS — M79.671 RIGHT FOOT PAIN: ICD-10-CM

## 2021-10-28 PROCEDURE — 99213 OFFICE O/P EST LOW 20 MIN: CPT | Performed by: PODIATRIST

## 2021-10-28 RX ORDER — BUPROPION HYDROCHLORIDE 300 MG/1
TABLET ORAL
COMMUNITY
Start: 2021-10-23

## 2021-11-29 ENCOUNTER — OFFICE VISIT (OUTPATIENT)
Dept: PODIATRY | Facility: CLINIC | Age: 51
End: 2021-11-29

## 2021-11-29 VITALS
BODY MASS INDEX: 43.4 KG/M2 | HEIGHT: 71 IN | WEIGHT: 310 LBS | DIASTOLIC BLOOD PRESSURE: 76 MMHG | HEART RATE: 77 BPM | OXYGEN SATURATION: 93 % | SYSTOLIC BLOOD PRESSURE: 124 MMHG

## 2021-11-29 DIAGNOSIS — S92.354D CLOSED NONDISPLACED FRACTURE OF FIFTH METATARSAL BONE OF RIGHT FOOT WITH ROUTINE HEALING, SUBSEQUENT ENCOUNTER: Primary | ICD-10-CM

## 2021-11-29 DIAGNOSIS — M79.671 RIGHT FOOT PAIN: ICD-10-CM

## 2021-11-29 PROCEDURE — 99213 OFFICE O/P EST LOW 20 MIN: CPT | Performed by: PODIATRIST

## 2021-11-29 RX ORDER — ALBUTEROL SULFATE 90 UG/1
2 AEROSOL, METERED RESPIRATORY (INHALATION)
COMMUNITY
Start: 2021-11-26 | End: 2022-11-27

## 2021-11-29 RX ORDER — DOXYCYCLINE 100 MG/1
CAPSULE ORAL
COMMUNITY
Start: 2021-11-26 | End: 2022-12-30

## 2021-11-29 NOTE — PROGRESS NOTES
"Dk Goldstein  1970  51 y.o. male    Patient presents to clinic today for follow up of right foot fracture. Xray obtained today     11/29/2021      Chief Complaint   Patient presents with   • Right Foot - Follow-up           History of Present Illness    Dk Goldstein is a 51 y.o. male who presents for follow-up of right fifth metatarsal fracture.  He has been ambulating in regular shoes.  Has begun a new job which is required him to be on his feet some.  Does note some achy pain at the end of longer days.    Past Medical History:   Diagnosis Date   • Acute pharyngitis    • Acute sinusitis    • Allergic rhinitis    • Benign hypertension    • Blood glucose abnormal    • Blood in urine    • Diverticular disease    • Encounter for routine adult health examination     Adult health examination   • Essential hypertension    • Gastroesophageal reflux disease    • Gout    • Hyperlipidemia    • Muscle pain    • Sleep apnea in adult     states he only uses c-pap when he \"feels really tired\"   • Upper respiratory infection    • Verruca    • Vitamin D deficiency    • Wheezing    • Worried well          Past Surgical History:   Procedure Laterality Date   • CHOLECYSTECTOMY N/A 3/6/2017    Procedure: LAPAROSCOPIC POSSIBLE OPEN CHOLECYSTECTOMY WITH INTRA OPERATIVE CHOLANGIOGRAM   ;  Surgeon: Oswaldo Cates MD;  Location: [facility] OR;  Service:    • COLONOSCOPY N/A 2/6/2017    Procedure: COLONOSCOPY;  Surgeon: Juan Luis Swann DO;  Location: [facility] ENDOSCOPY;  Service:    • ENDOSCOPY N/A 2/6/2017    Procedure: ESOPHAGOGASTRODUODENOSCOPY;  Surgeon: Juan Luis Swann DO;  Location: [facility] ENDOSCOPY;  Service:          Family History   Problem Relation Age of Onset   • Diabetes Father    • Hypertension Father    • Heart disease Father         Ischemic   • Heart attack Father         MI in 40s   • Osteoporosis Mother    • Thyroid disease Mother    • Cancer Maternal Grandfather          Social History     Socioeconomic " History   • Marital status:    Tobacco Use   • Smoking status: Former Smoker     Years: 20.00     Quit date: 2011     Years since quitting: 10.9   • Smokeless tobacco: Never Used   Vaping Use   • Vaping Use: Never used   Substance and Sexual Activity   • Alcohol use: No   • Drug use: No   • Sexual activity: Defer     Comment:          Current Outpatient Medications   Medication Sig Dispense Refill   • albuterol sulfate  (90 Base) MCG/ACT inhaler Inhale 2 puffs.     • allopurinol (ZYLOPRIM) 100 MG tablet Take 100 mg by mouth 2 (Two) Times a Day.     • atenolol (TENORMIN) 25 MG tablet Take 1 tablet by mouth 2 (Two) Times a Day. 60 tablet 2   • buPROPion XL (WELLBUTRIN XL) 300 MG 24 hr tablet      • CloNIDine (CATAPRES) 0.1 MG tablet Take 1 tablet by mouth 2 (Two) Times a Day As Needed for High Blood Pressure (blood pressure over 180/100). 30 tablet 0   • cyclobenzaprine (FLEXERIL) 10 MG tablet Take 1 tablet by mouth 3 (Three) Times a Day As Needed for Muscle Spasms. 21 tablet 0   • doxycycline (MONODOX) 100 MG capsule      • furosemide (LASIX) 40 MG tablet Take 40 mg by mouth 2 (Two) Times a Day. Patient states he takes daily as needed     • hydroCHLOROthiazide (MICROZIDE) 12.5 MG capsule      • HYDROcodone-acetaminophen (NORCO) 7.5-325 MG per tablet Take 1 tablet by mouth Every 6 (Six) Hours As Needed for Moderate Pain . 12 tablet 0   • hydrOXYzine (ATARAX) 25 MG tablet Take 25 mg by mouth.     • meclizine (ANTIVERT) 25 MG tablet Take 1 tablet by mouth Every 6 (Six) Hours As Needed for dizziness. 20 tablet 0   • naproxen (NAPROSYN) 500 MG tablet Take 500 mg by mouth 2 (Two) Times a Day With Meals.     • Omega-3 Fatty Acids (fish oil) 1000 MG capsule capsule Take 1 capsule by mouth Daily.     • pantoprazole (PROTONIX) 40 MG EC tablet Take 1 tablet by mouth Every Night. 30 tablet 0   • pravastatin (PRAVACHOL) 40 MG tablet Take 40 mg by mouth Every Night.     • traMADol (ULTRAM) 50 MG tablet      •  "vitamin D (ERGOCALCIFEROL) 45632 UNITS capsule capsule Take 50,000 Units by mouth 1 (One) Time Per Week.       No current facility-administered medications for this visit.         OBJECTIVE    /76   Pulse 77   Ht 180.3 cm (71\")   Wt (!) 141 kg (310 lb)   SpO2 93%   BMI 43.24 kg/m²       Review of Systems   Constitutional: Negative.    HENT: Negative.    Eyes: Negative.    Respiratory: Negative.    Cardiovascular: Positive for leg swelling.   Gastrointestinal: Negative.    Endocrine: Negative.    Genitourinary: Positive for enuresis.   Musculoskeletal: Positive for back pain.        Foot pain  Joint pain   Skin: Negative.    Allergic/Immunologic: Negative.    Neurological: Positive for dizziness.   Hematological: Negative.    Psychiatric/Behavioral: The patient is nervous/anxious.          Physical Exam   Constitutional: he appears well-developed and well-nourished.   HEENT: Normocephalic. Atraumatic  CV: No CP. RRR  Resp: Non-labored respirations  Psychiatric: he  has a normal mood and affect. he  behavior is normal.         Lower Extremity Exam:  Vascular: DP/PT pulses palpable 2+.   No edema  Foot warm  CFT wnl  Neuro: Protective sensation intact, b/l.  DTRs intact  Integument: No open wounds or lesions.  No ecchymosis  No erythema  Skin quality normal  Musculoskeletal: LE muscle strength 5/5.   Gait assisted with knee scooter  Ankle ROM full without pain or crepitus  STJ ROM full without pain or crepitus  Can flex/extend all toes  Minimal tenderness to palpation fifth metatarsal base, improving              ASSESSMENT AND PLAN    Diagnoses and all orders for this visit:    1. Closed nondisplaced fracture of fifth metatarsal bone of right foot with routine healing, subsequent encounter (Primary)  -     XR Foot 3+ View Right    2. Right foot pain      -Comprehensive foot and ankle exam  -Radiographs ordered and reviewed.  Continued, partial healing of fracture site noted.  -Continue regular shoes.  " Increase activity as tolerated.  -Recheck as needed          This document has been electronically signed by Juan Luis Ogden DPM on November 29, 2021 16:18 CST       Much of this encounter note is an electronic transcription/translation of spoken language to printed text.   Juan Luis Ogden DPM  11/29/2021  16:18 CST

## (undated) DEVICE — CATH URETRL OPEN END W/CONNECT 5F 70CM

## (undated) DEVICE — ENDOPATH XCEL BLADELESS TROCARS WITH STABILITY SLEEVES: Brand: ENDOPATH XCEL

## (undated) DEVICE — 3M™ STERI-STRIP™ REINFORCED ADHESIVE SKIN CLOSURES, R1547, 1/2 IN X 4 IN (12 MM X 100 MM), 6 STRIPS/ENVELOPE: Brand: 3M™ STERI-STRIP™

## (undated) DEVICE — ADHS LIQ MASTISOL 2/3ML

## (undated) DEVICE — SINGLE-USE BIOPSY FORCEPS: Brand: RADIAL JAW 4

## (undated) DEVICE — BITEBLOCK ENDO W/STRAP 60F A/ LF DISP

## (undated) DEVICE — ANTIBACTERIAL UNDYED BRAIDED (POLYGLACTIN 910), SYNTHETIC ABSORBABLE SUTURE: Brand: COATED VICRYL

## (undated) DEVICE — Device: Brand: DISPOSABLE ELECTROSURGICAL SNARE

## (undated) DEVICE — CANN SMPL SOFTECH BIFLO ETCO2 A/M 7FT

## (undated) DEVICE — GLV SURG TRIUMPH LT PF LTX 7.5 STRL

## (undated) DEVICE — DECANT BG O JET

## (undated) DEVICE — SOL IRRIG NACL 1000ML

## (undated) DEVICE — GLV SURG SENSICARE GREEN W/ALOE PF LF 7 STRL

## (undated) DEVICE — RETRV BG SPECI ENDOBAG 3X6IN 20.9CM

## (undated) DEVICE — TRAP SXN POLYP QUICKCATCH LF

## (undated) DEVICE — GLV SURG TRIUMPH PF LTX 6.5 STRL

## (undated) DEVICE — THE KUMAR CATHETER®, USED IN CONJUNCTION WITH KUMAR CHOLANGIOGRAPHY® CLAMP, IS MEANT TO PROVIDE A MEANS OF LAPAROSCOPIC CHOLANGIOGRAPHY. IT COMPRISES A TRANSLUCENT TUBING ( 76 CM. LENGTH AND 16 GA. ) THAT CARRIES A 19 GA., 1.25 CM LONG NEEDLE AT THE END. THE KUMAR CATHETER® IS USED TO PUNCTURE THE HARTMANN'S POUCH OF THE GALLBLADDER FOR BILIARY ACCESS AND / OR ASPIRATION. PRODUCT IS LATEX FREE.: Brand: KUMAR CATHETER®

## (undated) DEVICE — GLV SURG SENSICARE GREEN W/ALOE PF LF 8 STRL

## (undated) DEVICE — PAD GRND REM POLYHESIVE A/ DISP

## (undated) DEVICE — MONOPOLAR METZENBAUM SCISSOR TIP, DISPOSABLE: Brand: MONOPOLAR METZENBAUM SCISSOR TIP, DISPOSABLE

## (undated) DEVICE — PDS II VLT 0 107CM AG ST3: Brand: ENDOLOOP

## (undated) DEVICE — PK LAP CHOLE LF 60